# Patient Record
Sex: FEMALE | Race: BLACK OR AFRICAN AMERICAN
[De-identification: names, ages, dates, MRNs, and addresses within clinical notes are randomized per-mention and may not be internally consistent; named-entity substitution may affect disease eponyms.]

---

## 2020-03-06 ENCOUNTER — HOSPITAL ENCOUNTER (INPATIENT)
Dept: HOSPITAL 74 - YASAS | Age: 45
LOS: 4 days | Discharge: HOME | End: 2020-03-10
Attending: ALLERGY & IMMUNOLOGY | Admitting: ALLERGY & IMMUNOLOGY
Payer: COMMERCIAL

## 2020-03-06 VITALS — BODY MASS INDEX: 26.1 KG/M2

## 2020-03-06 DIAGNOSIS — K21.9: ICD-10-CM

## 2020-03-06 DIAGNOSIS — D64.9: ICD-10-CM

## 2020-03-06 DIAGNOSIS — J45.909: ICD-10-CM

## 2020-03-06 DIAGNOSIS — F10.230: Primary | ICD-10-CM

## 2020-03-06 DIAGNOSIS — Z59.0: ICD-10-CM

## 2020-03-06 DIAGNOSIS — F17.210: ICD-10-CM

## 2020-03-06 DIAGNOSIS — F33.9: ICD-10-CM

## 2020-03-06 DIAGNOSIS — F19.24: ICD-10-CM

## 2020-03-06 PROCEDURE — HZ2ZZZZ DETOXIFICATION SERVICES FOR SUBSTANCE ABUSE TREATMENT: ICD-10-PCS | Performed by: ALLERGY & IMMUNOLOGY

## 2020-03-06 RX ADMIN — Medication SCH TAB: at 14:52

## 2020-03-06 RX ADMIN — NICOTINE SCH MG: 7 PATCH TRANSDERMAL at 14:52

## 2020-03-06 RX ADMIN — HYDROXYZINE PAMOATE SCH MG: 25 CAPSULE ORAL at 18:05

## 2020-03-06 RX ADMIN — HYDROXYZINE PAMOATE SCH MG: 25 CAPSULE ORAL at 22:18

## 2020-03-06 RX ADMIN — Medication SCH MG: at 22:19

## 2020-03-06 RX ADMIN — HYDROXYZINE PAMOATE SCH MG: 25 CAPSULE ORAL at 14:52

## 2020-03-06 RX ADMIN — Medication SCH MG: at 22:18

## 2020-03-06 SDOH — ECONOMIC STABILITY - HOUSING INSECURITY: HOMELESSNESS: Z59.0

## 2020-03-06 NOTE — BHS.RME
Substance Use & Tx History





- Substance Use History


  ** Alcohol


Substance amount: 1 pint to one liter


Frequency of use: Daily


Substance route: Oral


Date of Last Use: 20





  ** Cannabis


Substance amount: one blunt


Frequency of use: Once a month


Substance route: Smoking


Date of Last Use: 20





Physical/Psych/Mental Status





- Behavior


General Behavior: Decreased activity


Eye Contact: Decreased





- Cooperativeness


Cooperativeness: Cooperative





- Thinking


Thought Processes: Tight


Thought content: Suicidal ideation ("I just want to do it, but I'm not going to 

do it")





- Physical Health Problems


Is patient presently having any pain?: No


Does patient presently have any injuries (include location): No


Does patient currently have a fever: No





CIWA


Nausea/Vomitin-Mild Nausea/No Vomiting


Muscle Tremors: 3


Anxiety: 1-Mildly Anxious


Agitation: 0-Normal Activity


Paroxysmal Sweats: No Perspiration


Orientation: 0-Oriented


Tacttile Disturbances: 0-None


Auditory Disturbances: 2-Mild Harshness/Frighten


Visual Disturbances: 3-Moderate Sensitivity


Headache: 2-Mild


CIWA-Ar Total Score: 12
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
## 2020-03-07 LAB
ALBUMIN SERPL-MCNC: 3.5 G/DL (ref 3.4–5)
ALP SERPL-CCNC: 73 U/L (ref 45–117)
ALT SERPL-CCNC: 24 U/L (ref 13–61)
ANION GAP SERPL CALC-SCNC: 8 MMOL/L (ref 8–16)
AST SERPL-CCNC: 24 U/L (ref 15–37)
BILIRUB SERPL-MCNC: 0.6 MG/DL (ref 0.2–1)
BUN SERPL-MCNC: 11.9 MG/DL (ref 7–18)
CALCIUM SERPL-MCNC: 9.1 MG/DL (ref 8.5–10.1)
CHLORIDE SERPL-SCNC: 105 MMOL/L (ref 98–107)
CO2 SERPL-SCNC: 28 MMOL/L (ref 21–32)
CREAT SERPL-MCNC: 0.9 MG/DL (ref 0.55–1.3)
DEPRECATED RDW RBC AUTO: 18.4 % (ref 11.6–15.6)
GLUCOSE SERPL-MCNC: 83 MG/DL (ref 74–106)
HCT VFR BLD CALC: 34.8 % (ref 32.4–45.2)
HGB BLD-MCNC: 11.3 GM/DL (ref 10.7–15.3)
MCH RBC QN AUTO: 27.4 PG (ref 25.7–33.7)
MCHC RBC AUTO-ENTMCNC: 32.4 G/DL (ref 32–36)
MCV RBC: 84.6 FL (ref 80–96)
PLATELET # BLD AUTO: 248 K/MM3 (ref 134–434)
PMV BLD: 9.1 FL (ref 7.5–11.1)
POTASSIUM SERPLBLD-SCNC: 4.4 MMOL/L (ref 3.5–5.1)
PROT SERPL-MCNC: 7.7 G/DL (ref 6.4–8.2)
RBC # BLD AUTO: 4.11 M/MM3 (ref 3.6–5.2)
SODIUM SERPL-SCNC: 142 MMOL/L (ref 136–145)
WBC # BLD AUTO: 4.1 K/MM3 (ref 4–10)

## 2020-03-07 RX ADMIN — Medication SCH MG: at 22:24

## 2020-03-07 RX ADMIN — HYDROXYZINE PAMOATE SCH: 25 CAPSULE ORAL at 19:23

## 2020-03-07 RX ADMIN — HYDROXYZINE PAMOATE SCH MG: 25 CAPSULE ORAL at 05:31

## 2020-03-07 RX ADMIN — HYDROXYZINE PAMOATE SCH: 25 CAPSULE ORAL at 13:39

## 2020-03-07 RX ADMIN — HYDROXYZINE PAMOATE SCH: 25 CAPSULE ORAL at 22:24

## 2020-03-07 RX ADMIN — HYDROXYZINE PAMOATE SCH MG: 25 CAPSULE ORAL at 10:25

## 2020-03-07 RX ADMIN — Medication SCH TAB: at 10:25

## 2020-03-07 RX ADMIN — NICOTINE SCH MG: 7 PATCH TRANSDERMAL at 10:26

## 2020-03-07 NOTE — PN
[facility] CIWA





- CIWA Score


Nausea/Vomitin-Mild Nausea/No Vomiting


Muscle Tremors: 2


Anxiety: 2


Agitation: 2


Paroxysmal Sweats: No Perspiration


Orientation: 0-Oriented


Tacttile Disturbances: 1-Very Mild Itch/Numbness


Auditory Disturbances: 0-None


Visual Disturbances: 0-None


Headache: 1-Very Mild


CIWA-Ar Total Score: 9





BHS Progress Note (SOAP)


Subjective: 





alert,irritable,anxious,interrupted sleep,tremor


Objective: 





20 13:46


                                   Vital Signs











Temperature  97.3 F L  20 08:47


 


Pulse Rate  72   20 08:47


 


Respiratory Rate  17   20 08:47


 


Blood Pressure  118/62   20 08:47


 


O2 Sat by Pulse Oximetry (%)      








                             Laboratory Last Values











WBC  4.1 K/mm3 (4.0-10.0)   20  05:45    


 


RBC  4.11 M/mm3 (3.60-5.2)   20  05:45    


 


Hgb  11.3 GM/dL (10.7-15.3)   20  05:45    


 


Hct  34.8 % (32.4-45.2)   20  05:45    


 


MCV  84.6 fl (80-96)   20  05:45    


 


MCH  27.4 pg (25.7-33.7)   20  05:45    


 


MCHC  32.4 g/dl (32.0-36.0)   20  05:45    


 


RDW  18.4 % (11.6-15.6)  H  20  05:45    


 


Plt Count  248 K/MM3 (134-434)   20  05:45    


 


MPV  9.1 fl (7.5-11.1)   20  05:45    


 


Sodium  142 mmol/L (136-145)   20  05:45    


 


Potassium  4.4 mmol/L (3.5-5.1)   20  05:45    


 


Chloride  105 mmol/L ()   20  05:45    


 


Carbon Dioxide  28 mmol/L (21-32)   20  05:45    


 


Anion Gap  8 MMOL/L (8-16)   20  05:45    


 


BUN  11.9 mg/dL (7-18)   20  05:45    


 


Creatinine  0.9 mg/dL (0.55-1.3)   20  05:45    


 


Est GFR (CKD-EPI)AfAm  90.76   20  05:45    


 


Est GFR (CKD-EPI)NonAf  78.31   20  05:45    


 


Random Glucose  83 mg/dL ()   20  05:45    


 


Calcium  9.1 mg/dL (8.5-10.1)   20  05:45    


 


Total Bilirubin  0.6 mg/dL (0.2-1)   20  05:45    


 


AST  24 U/L (15-37)   20  05:45    


 


ALT  24 U/L (13-61)   20  05:45    


 


Alkaline Phosphatase  73 U/L ()   20  05:45    


 


Total Protein  7.7 g/dl (6.4-8.2)   20  05:45    


 


Albumin  3.5 g/dl (3.4-5.0)   20  05:45    


 


RPR Titer  Nonreactive  (NONREACTIVE)   20  05:45    











Assessment: 





20 13:47


withdrawal symptom


Plan: 





continue detox librium regimen
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
## 2020-03-08 RX ADMIN — Medication SCH TAB: at 10:41

## 2020-03-08 RX ADMIN — HYDROXYZINE PAMOATE SCH MG: 25 CAPSULE ORAL at 05:39

## 2020-03-08 RX ADMIN — LOPERAMIDE HYDROCHLORIDE PRN MG: 2 CAPSULE ORAL at 16:17

## 2020-03-08 RX ADMIN — NICOTINE SCH MG: 7 PATCH TRANSDERMAL at 10:41

## 2020-03-08 RX ADMIN — HYDROXYZINE PAMOATE SCH MG: 25 CAPSULE ORAL at 10:41

## 2020-03-08 RX ADMIN — Medication SCH MG: at 22:31

## 2020-03-08 NOTE — PN
[facility] CIWA





- CIWA Score


Nausea/Vomitin-No Nausea/No Vomiting


Muscle Tremors: 2


Anxiety: 1-Mildly Anxious


Agitation: 2


Paroxysmal Sweats: 2


Orientation: 0-Oriented


Tacttile Disturbances: 0-None


Auditory Disturbances: 0-None


Visual Disturbances: 0-None


Headache: 0-None Present


CIWA-Ar Total Score: 7





BHS Progress Note (SOAP)


Subjective: 





sweats


diarrhea


Objective: 





20 12:49


                                   Vital Signs











Temperature  98.1 F   20 08:50


 


Pulse Rate  78   20 08:50


 


Respiratory Rate  18   20 08:50


 


Blood Pressure  129/95   20 08:50


 


O2 Sat by Pulse Oximetry (%)      








                                Laboratory Tests











  20





  05:45 05:45 05:45


 


WBC  4.1  


 


RBC  4.11  


 


Hgb  11.3  


 


Hct  34.8  


 


MCV  84.6  


 


MCH  27.4  


 


MCHC  32.4  


 


RDW  18.4 H  


 


Plt Count  248  


 


MPV  9.1  


 


Sodium   142 


 


Potassium   4.4 


 


Chloride   105 


 


Carbon Dioxide   28 


 


Anion Gap   8 


 


BUN   11.9 


 


Creatinine   0.9 


 


Est GFR (CKD-EPI)AfAm   90.76 


 


Est GFR (CKD-EPI)NonAf   78.31 


 


Random Glucose   83 


 


Calcium   9.1 


 


Total Bilirubin   0.6 


 


AST   24 


 


ALT   24 


 


Alkaline Phosphatase   73 


 


Total Protein   7.7 


 


Albumin   3.5 


 


RPR Titer    Nonreactive








labs noted


aaox3


ambulating


no acute distress


Assessment: 





20 12:50


withdrawals


Plan: 





continue detox


increase fluids


imodium prn

## 2020-03-09 RX ADMIN — LOPERAMIDE HYDROCHLORIDE PRN MG: 2 CAPSULE ORAL at 10:45

## 2020-03-09 RX ADMIN — NICOTINE SCH MG: 7 PATCH TRANSDERMAL at 10:44

## 2020-03-09 RX ADMIN — Medication SCH MG: at 22:28

## 2020-03-09 RX ADMIN — Medication SCH TAB: at 10:44

## 2020-03-09 NOTE — PN
[facility] CIWA





- CIWA Score


Nausea/Vomitin-No Nausea/No Vomiting


Muscle Tremors: 2


Anxiety: 1-Mildly Anxious


Agitation: 2


Paroxysmal Sweats: 1-Minimal Palms Moist


Orientation: 0-Oriented


Tacttile Disturbances: 0-None


Auditory Disturbances: 0-None


Visual Disturbances: 0-None


Headache: 0-None Present


CIWA-Ar Total Score: 6





BHS Progress Note (SOAP)


Subjective: 





sweats


diarrhea


nausea


interrupted sleep/insomnia





Objective: 





20 12:04


                                   Vital Signs











Temperature  99.1 F   20 08:42


 


Pulse Rate  80   20 08:42


 


Respiratory Rate  18   20 08:42


 


Blood Pressure  115/77   20 08:42


 


O2 Sat by Pulse Oximetry (%)      








                                Laboratory Tests











  20





  05:45 05:45 05:45


 


WBC  4.1  


 


RBC  4.11  


 


Hgb  11.3  


 


Hct  34.8  


 


MCV  84.6  


 


MCH  27.4  


 


MCHC  32.4  


 


RDW  18.4 H  


 


Plt Count  248  


 


MPV  9.1  


 


Sodium   142 


 


Potassium   4.4 


 


Chloride   105 


 


Carbon Dioxide   28 


 


Anion Gap   8 


 


BUN   11.9 


 


Creatinine   0.9 


 


Est GFR (CKD-EPI)AfAm   90.76 


 


Est GFR (CKD-EPI)NonAf   78.31 


 


Random Glucose   83 


 


Calcium   9.1 


 


Total Bilirubin   0.6 


 


AST   24 


 


ALT   24 


 


Alkaline Phosphatase   73 


 


Total Protein   7.7 


 


Albumin   3.5 


 


RPR Titer    Nonreactive








aaox3


lying in bed


no acute distress








Assessment: 





20 12:05


withdrawal sx








Plan: 





continue detox


increase fluids


pepto prn


melatonin 10mg

## 2020-03-10 VITALS — DIASTOLIC BLOOD PRESSURE: 69 MMHG | SYSTOLIC BLOOD PRESSURE: 125 MMHG | HEART RATE: 75 BPM | TEMPERATURE: 97.4 F

## 2020-03-10 NOTE — DS
[facility]HS Detox Discharge Summary


Admission Date: 


03/06/20





Discharge Date: 03/10/20





- History


Present History: Alcohol Dependence





- Physical Exam Results


Vital Signs: 


                                   Vital Signs











Temperature  98.2 F   03/10/20 06:52


 


Pulse Rate  82   03/10/20 06:52


 


Respiratory Rate  18   03/10/20 06:52


 


Blood Pressure  115/81   03/10/20 06:52


 


O2 Sat by Pulse Oximetry (%)      











Pertinent Admission Physical Exam Findings: 





                                   Vital Signs











Temperature  98.2 F   03/10/20 06:52


 


Pulse Rate  82   03/10/20 06:52


 


Respiratory Rate  18   03/10/20 06:52


 


Blood Pressure  115/81   03/10/20 06:52


 


O2 Sat by Pulse Oximetry (%)      








                                Laboratory Tests











  03/07/20 03/07/20 03/07/20





  05:45 05:45 05:45


 


WBC  4.1  


 


RBC  4.11  


 


Hgb  11.3  


 


Hct  34.8  


 


MCV  84.6  


 


MCH  27.4  


 


MCHC  32.4  


 


RDW  18.4 H  


 


Plt Count  248  


 


MPV  9.1  


 


Sodium   142 


 


Potassium   4.4 


 


Chloride   105 


 


Carbon Dioxide   28 


 


Anion Gap   8 


 


BUN   11.9 


 


Creatinine   0.9 


 


Est GFR (CKD-EPI)AfAm   90.76 


 


Est GFR (CKD-EPI)NonAf   78.31 


 


Random Glucose   83 


 


Calcium   9.1 


 


Total Bilirubin   0.6 


 


AST   24 


 


ALT   24 


 


Alkaline Phosphatase   73 


 


Total Protein   7.7 


 


Albumin   3.5 


 


RPR Titer    Nonreactive








aaox3


ambulating


no acute distress


+BS all 4 quadrants





- Treatment


Hospital Course: Detox Protocol Followed, Detoxed Safely, Responded well, 

Discharged Condition Good, Rehab Referral Accepted





- Medication


Discharge Medications: 


Ambulatory Orders





NK [No Known Home Medication]  03/06/20 











- Diagnosis


(1) Alcohol dependence with intoxication


Current Visit: Yes   Status: Acute   





(2) Anemia


Current Visit: Yes   Status: Acute   





(3) Asthma


Current Visit: Yes   Status: Acute   





(4) GERD (gastroesophageal reflux disease)


Current Visit: Yes   Status: Chronic   


Qualifiers: 


   Esophagitis presence: without esophagitis   Qualified Code(s): K21.9 - 

Gastro-esophageal reflux disease without esophagitis   





(5) Major depression, recurrent


Current Visit: Yes   Status: Acute   





(6) Alcohol use disorder


Current Visit: Yes   Status: Chronic   





(7) History of depression


Current Visit: Yes   Status: Chronic   





(8) Nicotine dependence


Current Visit: Yes   Status: Chronic   


Qualifiers: 


   Nicotine product type: cigarettes   Substance use status: uncomplicated   

Qualified Code(s): F17.210 - Nicotine dependence, cigarettes, uncomplicated   





(9) Substance induced mood disorder


Current Visit: Yes   Status: Chronic   





- AMA


Did Patient Leave Against Medical Advice: No

## 2020-08-07 ENCOUNTER — HOSPITAL ENCOUNTER (INPATIENT)
Dept: HOSPITAL 74 - YASAS | Age: 45
LOS: 3 days | Discharge: HOME | DRG: 773 | End: 2020-08-10
Attending: ALLERGY & IMMUNOLOGY | Admitting: ALLERGY & IMMUNOLOGY
Payer: COMMERCIAL

## 2020-08-07 VITALS — BODY MASS INDEX: 26.5 KG/M2

## 2020-08-07 DIAGNOSIS — J45.20: ICD-10-CM

## 2020-08-07 DIAGNOSIS — F10.230: Primary | ICD-10-CM
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
## 2020-08-07 DIAGNOSIS — F12.20: ICD-10-CM

## 2020-08-07 DIAGNOSIS — S61.218D: ICD-10-CM

## 2020-08-07 DIAGNOSIS — F19.24: ICD-10-CM

## 2020-08-07 DIAGNOSIS — Z59.0: ICD-10-CM

## 2020-08-07 DIAGNOSIS — K21.9: ICD-10-CM

## 2020-08-07 DIAGNOSIS — F17.213: ICD-10-CM

## 2020-08-07 DIAGNOSIS — R00.0: ICD-10-CM

## 2020-08-07 DIAGNOSIS — G47.00: ICD-10-CM

## 2020-08-07 DIAGNOSIS — Z91.19: ICD-10-CM

## 2020-08-07 DIAGNOSIS — Z91.5: ICD-10-CM

## 2020-08-07 DIAGNOSIS — S41.112D: ICD-10-CM

## 2020-08-07 DIAGNOSIS — F11.20: ICD-10-CM

## 2020-08-07 PROCEDURE — HZ2ZZZZ DETOXIFICATION SERVICES FOR SUBSTANCE ABUSE TREATMENT: ICD-10-PCS | Performed by: ALLERGY & IMMUNOLOGY

## 2020-08-07 PROCEDURE — U0003 INFECTIOUS AGENT DETECTION BY NUCLEIC ACID (DNA OR RNA); SEVERE ACUTE RESPIRATORY SYNDROME CORONAVIRUS 2 (SARS-COV-2) (CORONAVIRUS DISEASE [COVID-19]), AMPLIFIED PROBE TECHNIQUE, MAKING USE OF HIGH THROUGHPUT TECHNOLOGIES AS DESCRIBED BY CMS-2020-01-R: HCPCS

## 2020-08-07 RX ADMIN — HYDROXYZINE PAMOATE SCH MG: 25 CAPSULE ORAL at 22:14

## 2020-08-07 RX ADMIN — HYDROXYZINE PAMOATE SCH MG: 25 CAPSULE ORAL at 13:44

## 2020-08-07 RX ADMIN — Medication SCH TAB: at 13:44

## 2020-08-07 RX ADMIN — BUPRENORPHINE HYDROCHLORIDE, NALOXONE HYDROCHLORIDE SCH EACH: 8; 2 FILM, SOLUBLE BUCCAL; SUBLINGUAL at 22:15

## 2020-08-07 RX ADMIN — Medication SCH MG: at 22:14

## 2020-08-07 RX ADMIN — NICOTINE SCH MG: 7 PATCH TRANSDERMAL at 13:43

## 2020-08-07 RX ADMIN — HYDROXYZINE PAMOATE SCH MG: 25 CAPSULE ORAL at 17:39

## 2020-08-07 SDOH — ECONOMIC STABILITY - HOUSING INSECURITY: HOMELESSNESS: Z59.0
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
## 2020-08-07 NOTE — BHS.RME
Substance Use & Tx History





- Substance Use History


  ** Alcohol


Substance amount: 1 pint vodka


Frequency of use: Daily


Substance route: Oral


Date of Last Use: 08/06/20





  ** Marijuana/Hashish


Substance amount: $10


Frequency of use: Once a month


Substance route: Smoking


Date of Last Use: 07/31/20





  ** Nicotine


Substance amount: 1/2 PACK


Frequency of use: Daily


Substance route: Smoking


Date of Last Use: 08/07/20





Physical/Psych/Mental Status





- Behavior


General Behavior: Increased activity (restlessness, agitation)


Eye Contact: Normal





- Cooperativeness


Cooperativeness: Cooperative





- Thinking


Thought Processes: Tight, Logical, Goal Directed





- Physical Health Problems


Is patient presently having any pain?: No


Does patient presently have any injuries (include location): No


Does patient currently have a fever: No


Is patient pregnant: No





CIWA


Nausea/Vomiting: 3


Muscle Tremors: 6


Anxiety: 3


Agitation: 3


Paroxysmal Sweats: 1-Minimal Palms Moist


Orientation: 0-Oriented


Tacttile Disturbances: 0-None


Auditory Disturbances: 0-None


Visual Disturbances: 0-None


Headache: 2-Mild


CIWA-Ar Total Score: 18

## 2020-08-07 NOTE — PN
[facility] Progress Note


Note: 





Pt states she is on Suboxone





Patient Name: Slime BryantBirth Date: 1975


Address: [address] [place] [id number]: Female


Rx Written   Rx Dispensed   Drug   Quantity   Days Supply   Prescriber Name


07/20/2020 07/20/2020   buprenorphine-naloxone 8-2 mg sl film   90   30   

Deion Balbuena NP


06/18/2020 06/19/2020   buprenorphine-naloxone 8-2 mg sl film   90   30   

Deion Balbuena NP


10/01/2019   10/02/2019   buprenorphine-naloxone 8-2 mg sl film   30   10   

Rik Durand (PA)


09/05/2019 09/05/2019   buprenorphine-naloxone 8-2 mg sl film   60   20   Rik Brown (PA)





Will order Suboxone, pt states she takes it tid.

## 2020-08-08 LAB
ALBUMIN SERPL-MCNC: 3.2 G/DL (ref 3.4–5)
ALP SERPL-CCNC: 59 U/L (ref 45–117)
ALT SERPL-CCNC: 29 U/L (ref 13–61)
ANION GAP SERPL CALC-SCNC: 9 MMOL/L (ref 8–16)
AST SERPL-CCNC: 29 U/L (ref 15–37)
BILIRUB SERPL-MCNC: 0.9 MG/DL (ref 0.2–1)
BUN SERPL-MCNC: 9.4 MG/DL (ref 7–18)
CALCIUM SERPL-MCNC: 9.1 MG/DL (ref 8.5–10.1)
CHLORIDE SERPL-SCNC: 105 MMOL/L (ref 98–107)
CO2 SERPL-SCNC: 25 MMOL/L (ref 21–32)
CREAT SERPL-MCNC: 0.9 MG/DL (ref 0.55–1.3)
DEPRECATED RDW RBC AUTO: 17.8 % (ref 11.6–15.6)
GLUCOSE SERPL-MCNC: 103 MG/DL (ref 74–106)
HCT VFR BLD CALC: 34.7 % (ref 32.4–45.2)
HGB BLD-MCNC: 11.1 GM/DL (ref 10.7–15.3)
MCH RBC QN AUTO: 27.3 PG (ref 25.7–33.7)
MCHC RBC AUTO-ENTMCNC: 32 G/DL (ref 32–36)
MCV RBC: 85.2 FL (ref 80–96)
PLATELET # BLD AUTO: 159 K/MM3 (ref 134–434)
PMV BLD: 9.1 FL (ref 7.5–11.1)
POTASSIUM SERPLBLD-SCNC: 3.7 MMOL/L (ref 3.5–5.1)
PROT SERPL-MCNC: 6.9 G/DL (ref 6.4–8.2)
RBC # BLD AUTO: 4.07 M/MM3 (ref 3.6–5.2)
SODIUM SERPL-SCNC: 140 MMOL/L (ref 136–145)
WBC # BLD AUTO: 3.8 K/MM3 (ref 4–10)

## 2020-08-08 RX ADMIN — NICOTINE SCH MG: 7 PATCH TRANSDERMAL at 10:30

## 2020-08-08 RX ADMIN — Medication SCH MG: at 21:54

## 2020-08-08 RX ADMIN — ALUMINUM HYDROXIDE, MAGNESIUM HYDROXIDE, AND SIMETHICONE PRN ML: 200; 200; 20 SUSPENSION ORAL at 12:39

## 2020-08-08 RX ADMIN — NICOTINE POLACRILEX PRN MG: 2 GUM, CHEWING ORAL at 05:21

## 2020-08-08 RX ADMIN — HYDROXYZINE PAMOATE SCH MG: 25 CAPSULE ORAL at 05:20

## 2020-08-08 RX ADMIN — Medication SCH TAB: at 10:29

## 2020-08-08 RX ADMIN — HYDROXYZINE PAMOATE SCH MG: 25 CAPSULE ORAL at 17:58

## 2020-08-08 RX ADMIN — BUPRENORPHINE HYDROCHLORIDE, NALOXONE HYDROCHLORIDE SCH EACH: 8; 2 FILM, SOLUBLE BUCCAL; SUBLINGUAL at 05:20

## 2020-08-08 RX ADMIN — HYDROXYZINE PAMOATE SCH: 25 CAPSULE ORAL at 10:30

## 2020-08-08 RX ADMIN — BUPRENORPHINE HYDROCHLORIDE, NALOXONE HYDROCHLORIDE SCH EACH: 8; 2 FILM, SOLUBLE BUCCAL; SUBLINGUAL at 14:21

## 2020-08-08 RX ADMIN — HYDROXYZINE PAMOATE SCH MG: 25 CAPSULE ORAL at 21:54

## 2020-08-08 RX ADMIN — BUPRENORPHINE HYDROCHLORIDE, NALOXONE HYDROCHLORIDE SCH EACH: 8; 2 FILM, SOLUBLE BUCCAL; SUBLINGUAL at 21:54

## 2020-08-08 RX ADMIN — HYDROXYZINE PAMOATE SCH MG: 25 CAPSULE ORAL at 14:21

## 2020-08-08 NOTE — PN
[facility] CIWA





- CIWA Score


Nausea/Vomitin-No Nausea/No Vomiting


Muscle Tremors: 2


Anxiety: 3


Agitation: 1-Slight > Activity


Paroxysmal Sweats: 3


Orientation: 0-Oriented


Tacttile Disturbances: 0-None


Auditory Disturbances: 0-None


Visual Disturbances: 0-None


Headache: 2-Mild


CIWA-Ar Total Score: 11





BHS Progress Note (SOAP)


Subjective: 





c/o irritability, anxiety, sweats, shakes, and headache.


Objective: 





20 11:39


                                   Vital Signs











  20





  06:34 08:48


 


Temperature 97.4 F L 97.2 F L


 


Pulse Rate 66 84


 


Respiratory 18 20





Rate  


 


Blood Pressure 99/61 121/80


 


O2 Sat by Pulse 97 





Oximetry (%)  








                             Laboratory Last Values











WBC  3.8 K/mm3 (4.0-10.0)  L  20  06:50    


 


RBC  4.07 M/mm3 (3.60-5.2)   20  06:50    


 


Hgb  11.1 GM/dL (10.7-15.3)   20  06:50    


 


Hct  34.7 % (32.4-45.2)   20  06:50    


 


MCV  85.2 fl (80-96)   20  06:50    


 


MCH  27.3 pg (25.7-33.7)   20  06:50    


 


MCHC  32.0 g/dl (32.0-36.0)   20  06:50    


 


RDW  17.8 % (11.6-15.6)  H  20  06:50    


 


Plt Count  159 K/MM3 (134-434)   20  06:50    


 


MPV  9.1 fl (7.5-11.1)   20  06:50    


 


Sodium  140 mmol/L (136-145)   20  06:50    


 


Potassium  3.7 mmol/L (3.5-5.1)   20  06:50    


 


Chloride  105 mmol/L ()   20  06:50    


 


Carbon Dioxide  25 mmol/L (21-32)   20  06:50    


 


Anion Gap  9 MMOL/L (8-16)   20  06:50    


 


BUN  9.4 mg/dL (7-18)   20  06:50    


 


Creatinine  0.9 mg/dL (0.55-1.3)   20  06:50    


 


Est GFR (CKD-EPI)AfAm  90.76   20  06:50    


 


Est GFR (CKD-EPI)NonAf  78.31   20  06:50    


 


Random Glucose  103 mg/dL ()   20  06:50    


 


Calcium  9.1 mg/dL (8.5-10.1)   20  06:50    


 


Total Bilirubin  0.9 mg/dL (0.2-1)   20  06:50    


 


AST  29 U/L (15-37)   20  06:50    


 


ALT  29 U/L (13-61)   20  06:50    


 


Alkaline Phosphatase  59 U/L ()   20  06:50    


 


Total Protein  6.9 g/dl (6.4-8.2)   20  06:50    


 


Albumin  3.2 g/dl (3.4-5.0)  L  20  06:50    


 


Syphilis Serology  Non-reactive  (NONREACTIVE)   20  06:50    








Labs noted.


Assessment: 





20 11:39


AOX3 and in no acute respiratory distress.


Full ROM, ambulating in the unit.


Withdrawal symptoms.


Plan: 





continue detox.

## 2020-08-09 RX ADMIN — BUPRENORPHINE HYDROCHLORIDE, NALOXONE HYDROCHLORIDE SCH EACH: 8; 2 FILM, SOLUBLE BUCCAL; SUBLINGUAL at 22:18

## 2020-08-09 RX ADMIN — BACITRACIN ZINC SCH GM: 500 OINTMENT TOPICAL at 13:07

## 2020-08-09 RX ADMIN — HYDROXYZINE PAMOATE SCH MG: 25 CAPSULE ORAL at 17:38

## 2020-08-09 RX ADMIN — BUPRENORPHINE HYDROCHLORIDE, NALOXONE HYDROCHLORIDE SCH EACH: 8; 2 FILM, SOLUBLE BUCCAL; SUBLINGUAL at 05:22

## 2020-08-09 RX ADMIN — HYDROXYZINE PAMOATE SCH MG: 25 CAPSULE ORAL at 14:07

## 2020-08-09 RX ADMIN — ALUMINUM HYDROXIDE, MAGNESIUM HYDROXIDE, AND SIMETHICONE PRN ML: 200; 200; 20 SUSPENSION ORAL at 10:19

## 2020-08-09 RX ADMIN — ALUMINUM HYDROXIDE, MAGNESIUM HYDROXIDE, AND SIMETHICONE PRN ML: 200; 200; 20 SUSPENSION ORAL at 20:10

## 2020-08-09 RX ADMIN — HYDROXYZINE PAMOATE SCH MG: 25 CAPSULE ORAL at 10:19

## 2020-08-09 RX ADMIN — HYDROXYZINE PAMOATE SCH MG: 25 CAPSULE ORAL at 05:22

## 2020-08-09 RX ADMIN — BUPRENORPHINE HYDROCHLORIDE, NALOXONE HYDROCHLORIDE SCH EACH: 8; 2 FILM, SOLUBLE BUCCAL; SUBLINGUAL at 14:07

## 2020-08-09 RX ADMIN — Medication SCH MG: at 22:18

## 2020-08-09 RX ADMIN — NICOTINE POLACRILEX PRN MG: 2 GUM, CHEWING ORAL at 10:21

## 2020-08-09 RX ADMIN — HYDROXYZINE PAMOATE SCH MG: 25 CAPSULE ORAL at 22:18

## 2020-08-09 RX ADMIN — BACITRACIN ZINC SCH GM: 500 OINTMENT TOPICAL at 22:19

## 2020-08-09 RX ADMIN — Medication SCH TAB: at 10:19

## 2020-08-09 RX ADMIN — NICOTINE SCH MG: 7 PATCH TRANSDERMAL at 10:20

## 2020-08-09 NOTE — PN
S CIWA





- CIWA Score


Nausea/Vomitin-Mild Nausea/No Vomiting


Muscle Tremors: 4-Moderate,w/Arms Extend


Anxiety: 3


Agitation: 1-Slight > Activity


Paroxysmal Sweats: 1-Minimal Palms Moist


Orientation: 0-Oriented


Tacttile Disturbances: 0-None


Auditory Disturbances: 0-None


Visual Disturbances: 0-None


Headache: 0-None Present


CIWA-Ar Total Score: 10





BHS Progress Note (SOAP)


Subjective: 





44 years old female admitted on 20 for alcohol withdrawal sx management 

treating with librium detox regiment


feeling nausea denies vomiting


zofran 4 mg sl x 1





left inferior deltoid "glass" cut sutures intact mild epidermal edematous no 

bleed no exudates


left mid and 4th distal fingers "glass" cuts noted


sutures intact


encourage ms rufus to keep area clear and dry free of irritation 








Objective: 





20 12:48


                               Vital Signs - 24 hr











  20





  16:50 20:44 05:35


 


Temperature 96.9 F L 97.3 F L 97.3 F L


 


Pulse Rate 75 76 91 H


 


Respiratory 16 16 18





Rate   


 


Blood Pressure 134/94 142/85 100/60


 


O2 Sat by Pulse  97 97





Oximetry (%)   














  20





  08:36


 


Temperature 97.3 F L


 


Pulse Rate 106 H


 


Respiratory 20





Rate 


 


Blood Pressure 117/89


 


O2 Sat by Pulse 





Oximetry (%) 








                                Laboratory Tests











  20





  12:00 06:50 06:50


 


WBC   3.8 L 


 


RBC   4.07 


 


Hgb   11.1 


 


Hct   34.7 


 


MCV   85.2 


 


MCH   27.3 


 


MCHC   32.0 


 


RDW   17.8 H 


 


Plt Count   159 


 


MPV   9.1 


 


Sodium    140


 


Potassium    3.7


 


Chloride    105


 


Carbon Dioxide    25


 


Anion Gap    9


 


BUN    9.4


 


Creatinine    0.9


 


Est GFR (CKD-EPI)AfAm    90.76


 


Est GFR (CKD-EPI)NonAf    78.31


 


Random Glucose    103


 


Calcium    9.1


 


Total Bilirubin    0.9


 


AST    29


 


ALT    29


 


Alkaline Phosphatase    59


 


Total Protein    6.9


 


Albumin    3.2 L


 


Syphilis Serology   


 


COVID-19 ([facility])  Not detected  














  20





  06:50


 


WBC 


 


RBC 


 


Hgb 


 


Hct 


 


MCV 


 


MCH 


 


MCHC 


 


RDW 


 


Plt Count 


 


MPV 


 


Sodium 


 


Potassium 


 


Chloride 


 


Carbon Dioxide 


 


Anion Gap 


 


BUN 


 


Creatinine 


 


Est GFR (CKD-EPI)AfAm 


 


Est GFR (CKD-EPI)NonAf 


 


Random Glucose 


 


Calcium 


 


Total Bilirubin 


 


AST 


 


ALT 


 


Alkaline Phosphatase 


 


Total Protein 


 


Albumin 


 


Syphilis Serology  Non-reactive


 


COVID-19 ([facility]) 











Assessment: 





20 12:48


alcohol withdrawal 


Plan: 





librium regiment

## 2020-08-10 VITALS — HEART RATE: 105 BPM | TEMPERATURE: 96.9 F | SYSTOLIC BLOOD PRESSURE: 107 MMHG | DIASTOLIC BLOOD PRESSURE: 78 MMHG

## 2020-08-10 RX ADMIN — HYDROXYZINE PAMOATE SCH MG: 25 CAPSULE ORAL at 05:01

## 2020-08-10 RX ADMIN — BUPRENORPHINE HYDROCHLORIDE, NALOXONE HYDROCHLORIDE SCH EACH: 8; 2 FILM, SOLUBLE BUCCAL; SUBLINGUAL at 05:02

## 2020-08-10 NOTE — DS
[facility] Detox Discharge Summary


Admission Date: 


20





Discharge Date: 08/10/20





- History


Present History: Alcohol Dependence


Additional Comments: 





44 years old female admitted on 20 for alcohol withdrawal sx management 

treated with librium detox regiment


ms hooper states that she feels better today and prefers to  belonging 

from shelter and follow up with suboxone program for alcohol and opiate recovery

through behavioral and psychosocial therapies as well as "glass cut" care follow

up with [facility] for sutures removal


left arm deltoid sutures and left 4th distal finger sutures intact no signs of 

infection noted denies pain no bleed no exudate none tender





seen by psychiatrist no medical intervention necessary 


alert oriented x 3 speech clearly coherently 





General Appearance: Yes: No Apparent Distress, Moderate Distress, Thin, 

Tremorous, Irritable, Sweating, Anxious


HEENTM: Yes: EOMI, Hearing grossly Normal, Normal ENT Inspection, Normocephalic,

Normal Voice, ISHMAEL, Pharynx Normal, Tm's normal


Respiratory: Yes: Chest Non-Tender, Lungs Clear, Normal Breath Sounds, No 

Respiratory Distress, No Accessory Muscle Use


Neck: Yes: No masses,lesions,Nodules, Supple, Trachea in good position


Breast: Yes: Breast Exam Deferred


Cardiology: Yes: Regular Rhythm, S1, S2, Tachycardia


Abdominal: Yes: Normal Bowel Sounds, Non Tender, Soft, Protuberent


Genitourinary: Yes: Within Normal Limits


Back: Yes: Normal Inspection


Musculoskeletal: Yes: full range of Motion, Gait Steady, Pelvis Stable


Extremities: Yes: Normal Capillary Refill, Normal Inspection, Normal Range of 

Motion, Non-Tender, Other (laceration left upper arm with sutures in arm right 

finger laceration that is wrapped.)


Neurological: Yes: CNs II-XII NML intact, Fully Oriented, Alert, Motor Strength 

5/5, Normal Mood/Affect, Normal Response


Integumentary: Yes: Normal Color, Dry, Warm


Lymphatic: Yes: Within Normal Limits


Pertinent Past History: 





time for discharge 45 minutes





treatment team met with ms hooper to discuss benefits of librium regiment 

completion


ms hooper insists to guard her belonging at the shelter and prefers to follow 

up with suboxone program for alcohol recovery 





- Physical Exam Results


Vital Signs: 


                                   Vital Signs











Temperature  97.3 F L  08/10/20 06:49


 


Pulse Rate  89   08/10/20 06:49


 


Respiratory Rate  18   08/10/20 06:49


 


Blood Pressure  101/68   08/10/20 06:49


 


O2 Sat by Pulse Oximetry (%)  98   08/10/20 06:49











Pertinent Admission Physical Exam Findings: 





alcohol withdrawal 


                               Vital Signs - 24 hr











  20





  12:36 16:37 20:23


 


Temperature 97.1 F L 97.3 F L 97.3 F L


 


Pulse Rate 82 83 72


 


Respiratory 18 18 18





Rate   


 


Blood Pressure 136/90 119/87 152/95


 


O2 Sat by Pulse 100  100





Oximetry (%)   














  08/10/20 08/10/20





  06:49 08:43


 


Temperature 97.3 F L 96.9 F L


 


Pulse Rate 89 105 H


 


Respiratory 18 18





Rate  


 


Blood Pressure 101/68 107/78


 


O2 Sat by Pulse 98 





Oximetry (%)  








                                Laboratory Tests











  20





  12:00 06:50 06:50


 


WBC   3.8 L 


 


RBC   4.07 


 


Hgb   11.1 


 


Hct   34.7 


 


MCV   85.2 


 


MCH   27.3 


 


MCHC   32.0 


 


RDW   17.8 H 


 


Plt Count   159 


 


MPV   9.1 


 


Sodium    140


 


Potassium    3.7


 


Chloride    105


 


Carbon Dioxide    25


 


Anion Gap    9


 


BUN    9.4


 


Creatinine    0.9


 


Est GFR (CKD-EPI)AfAm    90.76


 


Est GFR (CKD-EPI)NonAf    78.31


 


Random Glucose    103


 


Calcium    9.1


 


Total Bilirubin    0.9


 


AST    29


 


ALT    29


 


Alkaline Phosphatase    59


 


Total Protein    6.9


 


Albumin    3.2 L


 


Syphilis Serology   


 


COVID-19 ([facility])  Not detected  














  20





  06:50


 


WBC 


 


RBC 


 


Hgb 


 


Hct 


 


MCV 


 


MCH 


 


MCHC 


 


RDW 


 


Plt Count 


 


MPV 


 


Sodium 


 


Potassium 


 


Chloride 


 


Carbon Dioxide 


 


Anion Gap 


 


BUN 


 


Creatinine 


 


Est GFR (CKD-EPI)AfAm 


 


Est GFR (CKD-EPI)NonAf 


 


Random Glucose 


 


Calcium 


 


Total Bilirubin 


 


AST 


 


ALT 


 


Alkaline Phosphatase 


 


Total Protein 


 


Albumin 


 


Syphilis Serology  Non-reactive


 


COVID-19 ([facility]) 








lab noted





- Treatment


Hospital Course: Detox Protocol Followed, Detoxed Safely, Responded well, 

Discharged Condition Good, Rehab Referral Accepted


Patient has Accepted a Rehab Referral to: suboxone maintenance program 





- Medication


Discharge Medications: 


Ambulatory Orders





NK [No Known Home Medication]  20 











- Diagnosis


(1) Alcohol dependence, uncomplicated


Status: Acute   





(2) Encounter for monitoring Suboxone maintenance therapy


Status: Chronic   





(3) Substance induced mood disorder


Status: Suspected   





(4) Asthma


Status: Chronic   


Qualifiers: 


   Asthma severity: mild   Asthma persistence: intermittent   Asthma 

complication type: with status asthmaticus   Qualified Code(s): J45.22 - Mild 

intermittent asthma with status asthmaticus   





(5) GERD (gastroesophageal reflux disease)


Status: Chronic   


Qualifiers: 


   Esophagitis presence: without esophagitis   Qualified Code(s): K21.9 - 

Gastro-esophageal reflux disease without esophagitis   





(6) Nicotine dependence


Status: Acute   


Qualifiers: 


   Nicotine product type: cigarettes   Substance use status: in withdrawal   

Qualified Code(s): F17.213 - Nicotine dependence, cigarettes, with withdrawal   





(7) Substance induced mood disorder


Status: Suspected   





(8) Laceration


Status: Acute   





- AMA


Did Patient Leave Against Medical Advice: No





CIWA Score





- CIWA Score


Nausea/Vomitin-No Nausea/No Vomiting


Muscle Tremors: 2


Anxiety: 2


Agitation: 1-Slight > Activity


Paroxysmal Sweats: No Perspiration


Orientation: 0-Oriented


Tacttile Disturbances: 0-None


Auditory Disturbances: 0-None


Visual Disturbances: 0-None


Headache: 0-None Present


CIWA-Ar Total Score: 5

## 2020-09-03 ENCOUNTER — HOSPITAL ENCOUNTER (INPATIENT)
Dept: HOSPITAL 74 - YASAS | Age: 45
LOS: 5 days | Discharge: OTHER INSTITUTION | DRG: 773 | End: 2020-09-08
Attending: ALLERGY & IMMUNOLOGY | Admitting: ALLERGY & IMMUNOLOGY
Payer: COMMERCIAL

## 2020-09-03 VITALS — BODY MASS INDEX: 26.6 KG/M2

## 2020-09-03 DIAGNOSIS — G47.00: ICD-10-CM

## 2020-09-03 DIAGNOSIS — F17.210: ICD-10-CM

## 2020-09-03 DIAGNOSIS — K21.9: ICD-10-CM

## 2020-09-03 DIAGNOSIS — Z79.899: ICD-10-CM

## 2020-09-03 DIAGNOSIS — F10.230: Primary | ICD-10-CM

## 2020-09-03 DIAGNOSIS — L85.3: ICD-10-CM

## 2020-09-03 DIAGNOSIS — F11.20: ICD-10-CM

## 2020-09-03 DIAGNOSIS — F12.10: ICD-10-CM

## 2020-09-03 DIAGNOSIS — Z51.81: ICD-10-CM

## 2020-09-03 DIAGNOSIS — D50.9: ICD-10-CM

## 2020-09-03 DIAGNOSIS — F19.24: ICD-10-CM

## 2020-09-03 DIAGNOSIS — Z91.19: ICD-10-CM

## 2020-09-03 DIAGNOSIS — J45.909: ICD-10-CM

## 2020-09-03 PROCEDURE — U0003 INFECTIOUS AGENT DETECTION BY NUCLEIC ACID (DNA OR RNA); SEVERE ACUTE RESPIRATORY SYNDROME CORONAVIRUS 2 (SARS-COV-2) (CORONAVIRUS DISEASE [COVID-19]), AMPLIFIED PROBE TECHNIQUE, MAKING USE OF HIGH THROUGHPUT TECHNOLOGIES AS DESCRIBED BY CMS-2020-01-R: HCPCS

## 2020-09-03 PROCEDURE — HZ2ZZZZ DETOXIFICATION SERVICES FOR SUBSTANCE ABUSE TREATMENT: ICD-10-PCS | Performed by: ALLERGY & IMMUNOLOGY

## 2020-09-03 RX ADMIN — Medication SCH: at 23:03

## 2020-09-03 RX ADMIN — BACITRACIN ZINC SCH GM: 500 OINTMENT TOPICAL at 23:03

## 2020-09-03 RX ADMIN — Medication SCH MG: at 23:02

## 2020-09-03 NOTE — HP
CIWA Score


Nausea/Vomitin-Mild Nausea/No Vomiting


Muscle Tremors: 3


Anxiety: 4-Mod. Anxious/Guarded


Agitation: 1-Slight > Activity


Paroxysmal Sweats: 2


Orientation: 0-Oriented


Tacttile Disturbances: 2-Mild Itch/Numbness/Burn


Auditory Disturbances: 0-None


Visual Disturbances: 1-Very Mild Sensitivity


Headache: 0-None Present


CIWA-Ar Total Score: 14





- Admission Criteria


[facility] Guidelines: Admission for Medically Managed Detox: 


Requires at least one of the followin. CIWA greater than 12


2. Seizures within the past 24 hours


3. Delirium tremens within the past 24 hours


4. Hallucinations within the past 24 hours


5. Acute intervention needed for co  occurring medical disorder


6. Acute intervention needed for co  occurring psychiatric disorder


7. Severe withdrawal that cannot be handled at a lower level of care (continued


    vomiting, continued diarrhea, abnormal vital signs) requiring intravenous


    medication and/or fluids


8. Pregnancy





Patient presents the following: CIWA greater than 12


Admission Criteria Met: Admission criteria met





Admitting History and Physical





- Admission


Chief Complaint: alcohol withdrawal symptoms





- Past Medical History


Pulmonary: Yes: Asthma


...LMP: 20


Heme/Onc: Yes: Anemia


Psych: Yes: Depression





- Past Surgical History


Past Surgical History: Yes: None





- Smoking History


Smoking history: Current every day smoker


Have you smoked in the past 12 months: Yes


Aproximately how many cigarettes per day: 10





- Alcohol/Substance Use


Hx Alcohol Use: Yes


Number of Drinks Daily: 10





- Social History


ADL: Independent


Occupation: unemployed


History of Recent Travel: No





Admission ROS [facility]





- [facility]


Chief Complaint: 





alcohol withdrawal sx 


Allergies/Adverse Reactions: 


                                    Allergies











Allergy/AdvReac Type Severity Reaction Status Date / Time


 


No Known Allergies Allergy   Verified 20 10:30











History of Present Illness: 





Patient is a 44 yo homeless, female with hx of opioid, and alcohol dependence is

here seeking inpatient detox for alcohol withdrawal. Denies any hospitalizations

in the past three months or emergency room visits. Patient denies hx of 

seizures, reports frequent alcohol blackouts. Reports on Suboxone program at 

[facility] on 8mg BID. PMHX: Asthma, GERD. Psych: Bipolar and anxiety. 

Denies suicidal /homicidal ideation. Last detox [facility] 20 -08/10/20, reports

relapsed soon after, is motivated to attend rehabilitation upon completing 

detox. 





Patient Name: Slime Wood Date: 1975


Address: [address] [place] [id number]: Female


Rx Written   Rx Dispensed   Drug   Quantity   Days Supply   Prescriber Name   

Payment Method   Dispenser


2020   buprenorphine-naloxone 8-2 mg sl film   90   30   

Deion Balbuena NP   Insurance   [facility]


2020   buprenorphine-naloxone 8-2 mg sl film   90   30   

Deion Balbuena NP   Insurance   [facility]


10/01/2019   10/02/2019   buprenorphine-naloxone 8-2 mg sl film   30   10   

Rik Durand ([place])   Insurance   [facility]


2019   buprenorphine-naloxone 8-2 mg sl film   60   20   

Rik Durand ([place])   Insurance   [facility]





Patient Name: Slime BryantBirth Date: 1975


Address: [address] [place] [id number]: Female


Rx Written   Rx Dispensed   Drug   Quantity   Days Supply   Prescriber Name   

Payment Method   Dispenser


2020   buprenorphine-naloxone 8-2 mg sl film   60   20   

Deion Balbuena NP   Insurance   [facility]


Exam Limitations: No Limitations





- Review of Systems


Constitutional: Chills, Loss of Appetite, Weakness


EENT: reports: No Symptoms Reported


Respiratory: reports: No Symptoms reported


Cardiac: reports: No Symptoms Reported


GI: reports: Poor Fluid Intake, Indigestion, Abdominal cramping


Musculoskeletal: reports: No Symptoms Reported


Integumentary: reports: No Symptoms Reported


Neuro: reports: Weakness


Endocrine: reports: Excessive Sweating, Increased Thirst


Hematology: reports: No Symptoms Reported


Psychiatric: reports: Orientated x3, Anxious


Other Systems: Reviewed and Negative





Patient History





- Patient Medical History


Hx Anemia: Yes (Not on medication)


Hx Asthma: Yes


Hx Chronic Obstructive Pulmonary Disease (COPD): No


Hx Cancer: No


Hx Cardiac Disorders: No


Hx Congestive Heart Failure: No


Hx Hypertension: No


Hx Hypercholesterolemia: No


Hx Pacemaker: No


HX Cerebrovascular Accident: No


Hx Seizures: No


Hx Dementia: No


Hx Diabetes: No


Hx Gastrointestinal Disorders: Yes


Hx Liver Disease: No


Hx Genitourinary Disorders: No


Hx Sexually Transmitted Disorders: Yes (DOES NOT RECALL)


Hx Renal Disease (ESRD): No


Hx Thyroid Disease: No


Hx Human Immunodeficiency Virus (HIV): No (last tested 20 unknown result)


Hx Hepatitis C: No


Hx Depression: Yes


Hx Suicide Attempt: Yes


Hx Bipolar Disorder: Yes


Hx Schizophrenia: No





- Patient Surgical History


Past Surgical History: No


Hx Neurologic Surgery: No


Hx Cataract Extraction: No


Hx Cardiac Surgery: No


Hx Lung Surgery: No


Hx Breast Surgery: No


Hx Breast Biopsy: No


Hx Abdominal Surgery: No


Hx Appendectomy: No


Hx Cholecystectomy: No


Hx Genitourinary Surgery: No


Hx  Section: No


Hx Orthopedic Surgery: No


Anesthesia Reaction: No





- PPD History


Previous Implant?: Yes


Documented Results: Negative w/proof


Date: 20


Results: 0MM


PPD to be Administered?: No





- Reproductive History


Patient is a Female of Child Bearing Age (11 -55 yrs old): Yes


Last Menstrual Period: 20


Patient Pregnant: No





- Smoking Cessation


Smoking history: Current every day smoker


Have you smoked in the past 12 months: Yes


Aproximately how many cigarettes per day: 10


Hx Chewing Tobacco Use: No


Initiated information on smoking cessation: Yes


'Breaking Loose' booklet given: 20





- Substance & Tx. History


Hx Alcohol Use: Yes


Hx Substance Use: Yes


Substance Use Type: Alcohol, Cocaine


Hx Substance Use Treatment: Yes (Detox [facility] 20 -08/10/20)





- Substances abused


  ** Alcohol


Substance route: Oral


Frequency: Daily


Amount used: 2 pints 


Age of first use: 18


Date of last use: 20





Admission Physical Exam S





- Physical


General Appearance: Yes: Disheveled, Mild Distress, Sweating, Anxious


HEENTM: Yes: EOMI, Hearing grossly Normal, Normal ENT Inspection, Normocephalic,

Normal Voice, ISHMAEL, Pharynx Normal, Tm's normal


Respiratory: Yes: Within Normal Limits


Neck: Yes: No masses,lesions,Nodules, Trachea in good position


Breast: Yes: Breast Exam Deferred


Cardiology: Yes: Regular Rhythm, Regular Rate


Abdominal: Yes: Normal Bowel Sounds, Non Tender, Flat, Soft


Genitourinary: Yes: Within Normal Limits


Back: Yes: Normal Inspection


Musculoskeletal: Yes: full range of Motion, Gait Steady, Pelvis Stable


Extremities: Yes: Normal Capillary Refill, Normal Inspection, Normal Range of 

Motion, Non-Tender


Neurological: Yes: CNs II-XII NML intact, Fully Oriented, Alert, Motor Strength 

5/5, Normal Mood/Affect, Depressed Affect


Integumentary: Yes: Normal Color, Dry, Warm


Lymphatic: Yes: Within Normal Limits





- Diagnostic


(1) Alcohol dependence with withdrawal, uncomplicated


Current Visit: Yes   Status: Acute   





(2) Opioid use disorder, moderate, on maintenance therapy, dependence


Current Visit: Yes   Status: Acute   





(3) Nicotine dependence


Current Visit: Yes   Status: Acute   


Qualifiers: 


   Nicotine product type: cigarettes   Substance use status: in withdrawal   

Qualified Code(s): F17.213 - Nicotine dependence, cigarettes, with withdrawal   





(4) Anemia


Current Visit: Yes   Status: Chronic   


Qualifiers: 


   Anemia type: iron deficiency 





(5) Cannabis dependence


Current Visit: Yes   Status: Chronic   





(6) GERD (gastroesophageal reflux disease)


Current Visit: Yes   Status: Chronic   


Qualifiers: 


   Esophagitis presence: without esophagitis   Qualified Code(s): K21.9 - 

Gastro-esophageal reflux disease without esophagitis   





Cleared for Admission S





- Detox or Rehab


[facility] Level of Care: Medically Managed


Detox Regimen/Protocol: Librium





Breathalyzer





- Breathalyzer


Breathalyzer: 0.161





Urine Drug Screen





- Test Device


Lot number: [id number]


Expiration date: 21





- Control


Is test valid?: Yes





- Results


Drug screen NEGATIVE: No


Urine drug screen results: BZO-Benzodiazepines





Inpatient Rehab Admission





- Rehab Decision to Admit


Inpatient rehab admission?: No

## 2020-09-04 LAB
ALBUMIN SERPL-MCNC: 3.3 G/DL (ref 3.4–5)
ALP SERPL-CCNC: 70 U/L (ref 45–117)
ALT SERPL-CCNC: 29 U/L (ref 13–61)
ANION GAP SERPL CALC-SCNC: 9 MMOL/L (ref 8–16)
APPEARANCE UR: (no result)
AST SERPL-CCNC: 31 U/L (ref 15–37)
BACTERIA # UR AUTO: 2133 /UL (ref 0–1359)
BILIRUB SERPL-MCNC: 0.7 MG/DL (ref 0.2–1)
BILIRUB UR STRIP.AUTO-MCNC: NEGATIVE MG/DL
BUN SERPL-MCNC: 7 MG/DL (ref 7–18)
CALCIUM SERPL-MCNC: 8.7 MG/DL (ref 8.5–10.1)
CASTS URNS QL MICRO: 2 /UL (ref 0–3.1)
CHLORIDE SERPL-SCNC: 107 MMOL/L (ref 98–107)
CO2 SERPL-SCNC: 25 MMOL/L (ref 21–32)
COLOR UR: YELLOW
CREAT SERPL-MCNC: 0.9 MG/DL (ref 0.55–1.3)
DEPRECATED RDW RBC AUTO: 17.8 % (ref 11.6–15.6)
EPITH CASTS URNS QL MICRO: >36 /UL (ref 0–25.1)
GLUCOSE SERPL-MCNC: 102 MG/DL (ref 74–106)
HCT VFR BLD CALC: 36.1 % (ref 32.4–45.2)
HGB BLD-MCNC: 11.6 GM/DL (ref 10.7–15.3)
KETONES UR QL STRIP: NEGATIVE
LEUKOCYTE ESTERASE UR QL STRIP.AUTO: NEGATIVE
MCH RBC QN AUTO: 27.3 PG (ref 25.7–33.7)
MCHC RBC AUTO-ENTMCNC: 32.2 G/DL (ref 32–36)
MCV RBC: 84.7 FL (ref 80–96)
NITRITE UR QL STRIP: NEGATIVE
PH UR: 5.5 [PH] (ref 5–8)
PLATELET # BLD AUTO: 183 K/MM3 (ref 134–434)
PMV BLD: 9.2 FL (ref 7.5–11.1)
POTASSIUM SERPLBLD-SCNC: 3.6 MMOL/L (ref 3.5–5.1)
PROT SERPL-MCNC: 7.2 G/DL (ref 6.4–8.2)
PROT UR QL STRIP: (no result)
PROT UR QL STRIP: NEGATIVE
RBC # BLD AUTO: 4.26 M/MM3 (ref 3.6–5.2)
RBC # BLD AUTO: 6 /UL (ref 0–23.9)
SODIUM SERPL-SCNC: 140 MMOL/L (ref 136–145)
SP GR UR: 1.02 (ref 1.01–1.03)
UROBILINOGEN UR STRIP-MCNC: 1 MG/DL (ref 0.2–1)
WBC # BLD AUTO: 3.4 K/MM3 (ref 4–10)
WBC # UR AUTO: 4 /UL (ref 0–25.8)

## 2020-09-04 RX ADMIN — BACITRACIN ZINC SCH GM: 500 OINTMENT TOPICAL at 22:34

## 2020-09-04 RX ADMIN — Medication SCH MG: at 22:35

## 2020-09-04 RX ADMIN — BACITRACIN ZINC SCH GM: 500 OINTMENT TOPICAL at 11:02

## 2020-09-04 RX ADMIN — CEPHALEXIN SCH MG: 500 CAPSULE ORAL at 23:03

## 2020-09-04 RX ADMIN — CEPHALEXIN SCH MG: 500 CAPSULE ORAL at 18:22

## 2020-09-04 RX ADMIN — Medication PRN APPLIC: at 22:44

## 2020-09-04 RX ADMIN — Medication SCH MG: at 22:34

## 2020-09-04 RX ADMIN — CEPHALEXIN SCH MG: 500 CAPSULE ORAL at 12:22

## 2020-09-04 RX ADMIN — Medication SCH TAB: at 11:02

## 2020-09-04 RX ADMIN — BUPRENORPHINE HYDROCHLORIDE, NALOXONE HYDROCHLORIDE SCH EACH: 8; 2 FILM, SOLUBLE BUCCAL; SUBLINGUAL at 11:02

## 2020-09-04 RX ADMIN — NICOTINE SCH MG: 14 PATCH, EXTENDED RELEASE TRANSDERMAL at 11:05

## 2020-09-04 NOTE — PN
BHS Progress Note


Note: 





                                   Vital Signs











Temperature  97.7 F   09/04/20 12:52


 


Pulse Rate  64   09/04/20 12:52


 


Respiratory Rate  16   09/04/20 12:52


 


Blood Pressure  126/72   09/04/20 12:52


 


O2 Sat by Pulse Oximetry (%)  98   09/04/20 12:52








c/o dry skin 


aveeno soap 


A&D oint 


continue to monitor
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
## 2020-09-05 RX ADMIN — CEPHALEXIN SCH MG: 500 CAPSULE ORAL at 05:39

## 2020-09-05 RX ADMIN — BACITRACIN ZINC SCH GM: 500 OINTMENT TOPICAL at 22:37

## 2020-09-05 RX ADMIN — CEPHALEXIN SCH MG: 500 CAPSULE ORAL at 13:03

## 2020-09-05 RX ADMIN — CEPHALEXIN SCH MG: 500 CAPSULE ORAL at 18:37

## 2020-09-05 RX ADMIN — BUPRENORPHINE HYDROCHLORIDE, NALOXONE HYDROCHLORIDE SCH EACH: 8; 2 FILM, SOLUBLE BUCCAL; SUBLINGUAL at 10:24

## 2020-09-05 RX ADMIN — Medication SCH MG: at 22:36

## 2020-09-05 RX ADMIN — NICOTINE SCH MG: 14 PATCH, EXTENDED RELEASE TRANSDERMAL at 10:24

## 2020-09-05 RX ADMIN — BACITRACIN ZINC SCH GM: 500 OINTMENT TOPICAL at 10:23

## 2020-09-05 RX ADMIN — ALUMINUM HYDROXIDE, MAGNESIUM HYDROXIDE, AND SIMETHICONE PRN ML: 200; 200; 20 SUSPENSION ORAL at 18:37

## 2020-09-05 RX ADMIN — Medication SCH TAB: at 10:23

## 2020-09-05 RX ADMIN — Medication SCH MG: at 22:37

## 2020-09-05 RX ADMIN — NICOTINE POLACRILEX PRN MG: 2 GUM, CHEWING ORAL at 13:03

## 2020-09-05 NOTE — PN
S CIWA





- CIWA Score


Nausea/Vomitin-No Nausea/No Vomiting


Muscle Tremors: 2


Anxiety: 3


Agitation: 0-Normal Activity


Paroxysmal Sweats: 3


Orientation: 0-Oriented


Tacttile Disturbances: 0-None


Auditory Disturbances: 0-None


Visual Disturbances: 0-None


Headache: 2-Mild


CIWA-Ar Total Score: 10





BHS Progress Note (SOAP)


Subjective: 





c/o sweats, anxiety, shakes, and headache.


Objective: 





20 10:37


                                   Vital Signs











  20





  06:35 08:50


 


Temperature 97.6 F 96.1 F L


 


Pulse Rate 84 81


 


Respiratory 18 18





Rate  


 


Blood Pressure 106/65 101/68


 


O2 Sat by Pulse 96 96





Oximetry (%)  








                             Laboratory Last Values











WBC  3.4 K/mm3 (4.0-10.0)  L  20  08:40    


 


RBC  4.26 M/mm3 (3.60-5.2)   20  08:40    


 


Hgb  11.6 GM/dL (10.7-15.3)   20  08:40    


 


Hct  36.1 % (32.4-45.2)   20  08:40    


 


MCV  84.7 fl (80-96)   20  08:40    


 


MCH  27.3 pg (25.7-33.7)   20  08:40    


 


MCHC  32.2 g/dl (32.0-36.0)   20  08:40    


 


RDW  17.8 % (11.6-15.6)  H  20  08:40    


 


Plt Count  183 K/MM3 (134-434)   20  08:40    


 


MPV  9.2 fl (7.5-11.1)   20  08:40    


 


Sodium  140 mmol/L (136-145)   20  08:40    


 


Potassium  3.6 mmol/L (3.5-5.1)   20  08:40    


 


Chloride  107 mmol/L ()   20  08:40    


 


Carbon Dioxide  25 mmol/L (21-32)   20  08:40    


 


Anion Gap  9 MMOL/L (8-16)   20  08:40    


 


BUN  7.0 mg/dL (7-18)   20  08:40    


 


Creatinine  0.9 mg/dL (0.55-1.3)   20  08:40    


 


Est GFR (CKD-EPI)AfAm  90.13   20  08:40    


 


Est GFR (CKD-EPI)NonAf  77.76   20  08:40    


 


Random Glucose  102 mg/dL ()   20  08:40    


 


Calcium  8.7 mg/dL (8.5-10.1)   20  08:40    


 


Total Bilirubin  0.7 mg/dL (0.2-1)   20  08:40    


 


AST  31 U/L (15-37)   20  08:40    


 


ALT  29 U/L (13-61)   20  08:40    


 


Alkaline Phosphatase  70 U/L ()   20  08:40    


 


Total Protein  7.2 g/dl (6.4-8.2)   20  08:40    


 


Albumin  3.3 g/dl (3.4-5.0)  L  20  08:40    


 


Urine Color  Yellow   20  15:38    


 


Urine Appearance  Cloudy   20  15:38    


 


Urine pH  5.5  (5.0-8.0)   20  15:38    


 


Ur Specific Gravity  1.022  (1.010-1.035)   20  15:38    


 


Urine Protein  1+  (NEGATIVE)  H  20  15:38    


 


Urine Glucose (UA)  Negative  (NEGATIVE)   20  15:38    


 


Urine Ketones  Negative  (NEGATIVE)   20  15:38    


 


Urine Blood  Negative  (NEGATIVE)   20  15:38    


 


Urine Nitrite  Negative  (NEGATIVE)   20  15:38    


 


Urine Bilirubin  Negative  (NEGATIVE)   20  15:38    


 


Urine Urobilinogen  1.0 mg/dL (0.2-1.0)   20  15:38    


 


Ur Leukocyte Esterase  Negative  (NEGATIVE)   20  15:38    


 


Urine WBC (Auto)  4 /uL (0-25.8)   20  15:38    


 


Urine RBC (Auto)  6 /uL (0-23.9)   20  15:38    


 


Urine Casts (Auto)  2 /uL (0-3.1)   20  15:38    


 


U Epithel Cells (Auto)  >36 /uL (0-25.1)   20  15:38    


 


Urine Bacteria (Auto)  2133 /uL (0-1359)   20  15:38    


 


POC Urine HCG, Qual  Negative   20  20:07    


 


Syphilis Serology  Non-reactive  (NONREACTIVE)   20  08:40    








Labs noted.


Assessment: 





20 10:37


AOX3, in no acute respiratory distress.


Full ROM, ambulating in the unit.


Withdrawal symptoms.


Plan: 





continue detox.

## 2020-09-06 RX ADMIN — Medication SCH MG: at 22:08

## 2020-09-06 RX ADMIN — BUPRENORPHINE HYDROCHLORIDE, NALOXONE HYDROCHLORIDE SCH EACH: 8; 2 FILM, SOLUBLE BUCCAL; SUBLINGUAL at 10:15

## 2020-09-06 RX ADMIN — Medication SCH TAB: at 10:15

## 2020-09-06 RX ADMIN — Medication PRN APPLIC: at 22:10

## 2020-09-06 RX ADMIN — CEPHALEXIN SCH MG: 500 CAPSULE ORAL at 17:22

## 2020-09-06 RX ADMIN — BACITRACIN ZINC SCH GM: 500 OINTMENT TOPICAL at 22:09

## 2020-09-06 RX ADMIN — NICOTINE SCH MG: 14 PATCH, EXTENDED RELEASE TRANSDERMAL at 10:15

## 2020-09-06 RX ADMIN — BACITRACIN ZINC SCH GM: 500 OINTMENT TOPICAL at 10:15

## 2020-09-06 RX ADMIN — CEPHALEXIN SCH MG: 500 CAPSULE ORAL at 01:00

## 2020-09-06 RX ADMIN — CEPHALEXIN SCH MG: 500 CAPSULE ORAL at 13:03

## 2020-09-06 RX ADMIN — CEPHALEXIN SCH MG: 500 CAPSULE ORAL at 05:43

## 2020-09-06 NOTE — PN
S CIWA





- CIWA Score


Nausea/Vomitin-Mild Nausea/No Vomiting


Muscle Tremors: 2


Anxiety: 1-Mildly Anxious


Agitation: 0-Normal Activity


Paroxysmal Sweats: 2


Orientation: 0-Oriented


Tacttile Disturbances: 0-None


Auditory Disturbances: 0-None


Visual Disturbances: 0-None


Headache: 2-Mild


CIWA-Ar Total Score: 8





BHS Progress Note (SOAP)


Subjective: 





45 years old female was admitted on 20 for alcohol withdrawal sx 

management 


treating with librium detox regiment


taking suboxone 8-2 mg sl bid due to negative suboxon urine tox upon admission


begin suboxone 8-2 mg sl po daily 


ms hooper is willing return to to suboxone program for behavior and 

psychosocial therapies 


Objective: 





20 14:07


                               Vital Signs - 24 hr











  20





  16:19 20:43 06:24


 


Temperature 97.1 F L 97.5 F L 98.0 F


 


Pulse Rate 66 101 H 70


 


Respiratory 18 18 16





Rate   


 


Blood Pressure 124/79 117/87 99/59 L


 


O2 Sat by Pulse 98 100 100





Oximetry (%)   














  20





  08:52 13:25


 


Temperature 98.3 F 97.1 F L


 


Pulse Rate 78 106 H


 


Respiratory 18 18





Rate  


 


Blood Pressure 119/52 L 116/76


 


O2 Sat by Pulse 100 100





Oximetry (%)  








                                Laboratory Tests











  20





  10:30 20:07 08:40


 


WBC   


 


RBC   


 


Hgb   


 


Hct   


 


MCV   


 


MCH   


 


MCHC   


 


RDW   


 


Plt Count   


 


MPV   


 


Sodium   


 


Potassium   


 


Chloride   


 


Carbon Dioxide   


 


Anion Gap   


 


BUN   


 


Creatinine   


 


Est GFR (CKD-EPI)AfAm   


 


Est GFR (CKD-EPI)NonAf   


 


Random Glucose   


 


Calcium   


 


Total Bilirubin   


 


AST   


 


ALT   


 


Alkaline Phosphatase   


 


Total Protein   


 


Albumin   


 


Urine Color   


 


Urine Appearance   


 


Urine pH   


 


Ur Specific Gravity   


 


Urine Protein   


 


Urine Glucose (UA)   


 


Urine Ketones   


 


Urine Blood   


 


Urine Nitrite   


 


Urine Bilirubin   


 


Urine Urobilinogen   


 


Ur Leukocyte Esterase   


 


Urine WBC (Auto)   


 


Urine RBC (Auto)   


 


Urine Casts (Auto)   


 


U Epithel Cells (Auto)   


 


Urine Bacteria (Auto)   


 


POC Urine HCG, Qual   Negative 


 


Syphilis Serology    Non-reactive


 


COVID-19 ([facility])  Not detected  














  20





  08:40 08:40 15:38


 


WBC  3.4 L  


 


RBC  4.26  


 


Hgb  11.6  


 


Hct  36.1  


 


MCV  84.7  


 


MCH  27.3  


 


MCHC  32.2  


 


RDW  17.8 H  


 


Plt Count  183  


 


MPV  9.2  


 


Sodium   140 


 


Potassium   3.6 


 


Chloride   107 


 


Carbon Dioxide   25 


 


Anion Gap   9 


 


BUN   7.0 


 


Creatinine   0.9 


 


Est GFR (CKD-EPI)AfAm   90.13 


 


Est GFR (CKD-EPI)NonAf   77.76 


 


Random Glucose   102 


 


Calcium   8.7 


 


Total Bilirubin   0.7 


 


AST   31 


 


ALT   29 


 


Alkaline Phosphatase   70 


 


Total Protein   7.2 


 


Albumin   3.3 L 


 


Urine Color    Yellow


 


Urine Appearance    Cloudy


 


Urine pH    5.5


 


Ur Specific Gravity    1.022


 


Urine Protein    1+ H


 


Urine Glucose (UA)    Negative


 


Urine Ketones    Negative


 


Urine Blood    Negative


 


Urine Nitrite    Negative


 


Urine Bilirubin    Negative


 


Urine Urobilinogen    1.0


 


Ur Leukocyte Esterase    Negative


 


Urine WBC (Auto)    4


 


Urine RBC (Auto)    6


 


Urine Casts (Auto)    2


 


U Epithel Cells (Auto)    >36


 


Urine Bacteria (Auto)    2133


 


POC Urine HCG, Qual   


 


Syphilis Serology   


 


COVID-19 ([facility])   








possible ua specimen contamination 


Assessment: 





20 14:11


alcohol withdrawal 


Plan: 





librium regiment

## 2020-09-07 LAB
APPEARANCE UR: (no result)
BILIRUB UR STRIP.AUTO-MCNC: NEGATIVE MG/DL
COLOR UR: YELLOW
KETONES UR QL STRIP: NEGATIVE
LEUKOCYTE ESTERASE UR QL STRIP.AUTO: NEGATIVE
NITRITE UR QL STRIP: NEGATIVE
PH UR: 5 [PH] (ref 5–8)
PROT UR QL STRIP: NEGATIVE
PROT UR QL STRIP: NEGATIVE
SP GR UR: 1.01 (ref 1.01–1.03)
UROBILINOGEN UR STRIP-MCNC: 0.2 MG/DL (ref 0.2–1)

## 2020-09-07 RX ADMIN — Medication SCH MG: at 22:30

## 2020-09-07 RX ADMIN — BACITRACIN ZINC SCH GM: 500 OINTMENT TOPICAL at 10:32

## 2020-09-07 RX ADMIN — BUPRENORPHINE HYDROCHLORIDE, NALOXONE HYDROCHLORIDE SCH EACH: 8; 2 FILM, SOLUBLE BUCCAL; SUBLINGUAL at 10:35

## 2020-09-07 RX ADMIN — Medication SCH TAB: at 10:31

## 2020-09-07 RX ADMIN — NICOTINE POLACRILEX PRN MG: 2 GUM, CHEWING ORAL at 13:15

## 2020-09-07 RX ADMIN — Medication PRN APPLIC: at 22:32

## 2020-09-07 RX ADMIN — ALUMINUM HYDROXIDE, MAGNESIUM HYDROXIDE, AND SIMETHICONE PRN ML: 200; 200; 20 SUSPENSION ORAL at 22:32

## 2020-09-07 RX ADMIN — ALUMINUM HYDROXIDE, MAGNESIUM HYDROXIDE, AND SIMETHICONE PRN ML: 200; 200; 20 SUSPENSION ORAL at 01:12

## 2020-09-07 RX ADMIN — BACITRACIN ZINC SCH GM: 500 OINTMENT TOPICAL at 22:30

## 2020-09-07 RX ADMIN — NICOTINE SCH MG: 14 PATCH, EXTENDED RELEASE TRANSDERMAL at 10:32

## 2020-09-07 NOTE — PN
S CIWA





- CIWA Score


Nausea/Vomitin-Mild Nausea/No Vomiting


Muscle Tremors: 1-None Visible, but Felt


Anxiety: 2


Agitation: 0-Normal Activity


Paroxysmal Sweats: 1-Minimal Palms Moist


Orientation: 0-Oriented


Tacttile Disturbances: 0-None


Auditory Disturbances: 0-None


Visual Disturbances: 1-Very Mild Sensitivity


Headache: 0-None Present


CIWA-Ar Total Score: 6





BHS Progress Note (SOAP)


Subjective: 





45 years old female was admitted on 20 for alcohol withdrawal sx 

management


treating with librium detox regiment


feels nausea after breakfast


zofran 4 mg sl x 1





discussing aftercare with staff ms hooper prefers [place] for alcohol abuse 

treatment 


Objective: 





20 10:23


                               Vital Signs - 24 hr











  20





  13:25 16:50 20:33


 


Temperature 97.1 F L 98.0 F 97.2 F L


 


Pulse Rate 106 H 85 70


 


Respiratory 18 18 16





Rate   


 


Blood Pressure 116/76 142/98 135/92


 


O2 Sat by Pulse 100  100





Oximetry (%)   














  20





  06:56 08:56


 


Temperature 97.0 F L 97.5 F L


 


Pulse Rate 82 85


 


Respiratory 18 18





Rate  


 


Blood Pressure 119/83 109/82


 


O2 Sat by Pulse 100 





Oximetry (%)  








                                Laboratory Tests











  20





  10:30 20:07 08:40


 


WBC   


 


RBC   


 


Hgb   


 


Hct   


 


MCV   


 


MCH   


 


MCHC   


 


RDW   


 


Plt Count   


 


MPV   


 


Sodium   


 


Potassium   


 


Chloride   


 


Carbon Dioxide   


 


Anion Gap   


 


BUN   


 


Creatinine   


 


Est GFR (CKD-EPI)AfAm   


 


Est GFR (CKD-EPI)NonAf   


 


Random Glucose   


 


Calcium   


 


Total Bilirubin   


 


AST   


 


ALT   


 


Alkaline Phosphatase   


 


Total Protein   


 


Albumin   


 


Urine Color   


 


Urine Appearance   


 


Urine pH   


 


Ur Specific Gravity   


 


Urine Protein   


 


Urine Glucose (UA)   


 


Urine Ketones   


 


Urine Blood   


 


Urine Nitrite   


 


Urine Bilirubin   


 


Urine Urobilinogen   


 


Ur Leukocyte Esterase   


 


Urine WBC (Auto)   


 


Urine RBC (Auto)   


 


Urine Casts (Auto)   


 


U Epithel Cells (Auto)   


 


Urine Bacteria (Auto)   


 


POC Urine HCG, Qual   Negative 


 


Syphilis Serology    Non-reactive


 


COVID-19 ([facility])  Not detected  














  20





  08:40 08:40 15:38


 


WBC  3.4 L  


 


RBC  4.26  


 


Hgb  11.6  


 


Hct  36.1  


 


MCV  84.7  


 


MCH  27.3  


 


MCHC  32.2  


 


RDW  17.8 H  


 


Plt Count  183  


 


MPV  9.2  


 


Sodium   140 


 


Potassium   3.6 


 


Chloride   107 


 


Carbon Dioxide   25 


 


Anion Gap   9 


 


BUN   7.0 


 


Creatinine   0.9 


 


Est GFR (CKD-EPI)AfAm   90.13 


 


Est GFR (CKD-EPI)NonAf   77.76 


 


Random Glucose   102 


 


Calcium   8.7 


 


Total Bilirubin   0.7 


 


AST   31 


 


ALT   29 


 


Alkaline Phosphatase   70 


 


Total Protein   7.2 


 


Albumin   3.3 L 


 


Urine Color    Yellow


 


Urine Appearance    Cloudy


 


Urine pH    5.5


 


Ur Specific Gravity    1.022


 


Urine Protein    1+ H


 


Urine Glucose (UA)    Negative


 


Urine Ketones    Negative


 


Urine Blood    Negative


 


Urine Nitrite    Negative


 


Urine Bilirubin    Negative


 


Urine Urobilinogen    1.0


 


Ur Leukocyte Esterase    Negative


 


Urine WBC (Auto)    4


 


Urine RBC (Auto)    6


 


Urine Casts (Auto)    2


 


U Epithel Cells (Auto)    >36


 


Urine Bacteria (Auto)    2133


 


POC Urine HCG, Qual   


 


Syphilis Serology   


 


COVID-19 ([facility])   








rule out urine specimen contamination


20 10:24


repeat ua


Assessment: 





20 10:24


alcohol withdrawal 


Plan: 





librium regiment

## 2020-09-08 ENCOUNTER — HOSPITAL ENCOUNTER (INPATIENT)
Dept: HOSPITAL 74 - YASAS | Age: 45
LOS: 23 days | Discharge: HOME | DRG: 772 | End: 2020-10-01
Attending: ALLERGY & IMMUNOLOGY | Admitting: ALLERGY & IMMUNOLOGY
Payer: COMMERCIAL

## 2020-09-08 VITALS — SYSTOLIC BLOOD PRESSURE: 132 MMHG | HEART RATE: 87 BPM | TEMPERATURE: 97.3 F | DIASTOLIC BLOOD PRESSURE: 89 MMHG

## 2020-09-08 DIAGNOSIS — F12.20: ICD-10-CM

## 2020-09-08 DIAGNOSIS — F17.210: ICD-10-CM

## 2020-09-08 DIAGNOSIS — Z79.899: ICD-10-CM

## 2020-09-08 DIAGNOSIS — D64.9: ICD-10-CM

## 2020-09-08 DIAGNOSIS — F41.9: ICD-10-CM

## 2020-09-08 DIAGNOSIS — Z56.0: ICD-10-CM

## 2020-09-08 DIAGNOSIS — Z51.81: ICD-10-CM

## 2020-09-08 DIAGNOSIS — K21.9: ICD-10-CM

## 2020-09-08 DIAGNOSIS — F19.24: ICD-10-CM

## 2020-09-08 DIAGNOSIS — Z62.810: ICD-10-CM

## 2020-09-08 DIAGNOSIS — Z59.0: ICD-10-CM

## 2020-09-08 DIAGNOSIS — F31.9: ICD-10-CM

## 2020-09-08 DIAGNOSIS — F11.20: ICD-10-CM

## 2020-09-08 DIAGNOSIS — F10.20: Primary | ICD-10-CM

## 2020-09-08 DIAGNOSIS — G47.00: ICD-10-CM

## 2020-09-08 DIAGNOSIS — J45.20: ICD-10-CM

## 2020-09-08 PROCEDURE — HZ42ZZZ GROUP COUNSELING FOR SUBSTANCE ABUSE TREATMENT, COGNITIVE-BEHAVIORAL: ICD-10-PCS | Performed by: ALLERGY & IMMUNOLOGY

## 2020-09-08 RX ADMIN — Medication SCH MG: at 21:19

## 2020-09-08 RX ADMIN — Medication SCH TAB: at 10:19

## 2020-09-08 RX ADMIN — NICOTINE SCH MG: 14 PATCH, EXTENDED RELEASE TRANSDERMAL at 10:20

## 2020-09-08 RX ADMIN — Medication SCH: at 18:25

## 2020-09-08 RX ADMIN — Medication SCH MG: at 21:18

## 2020-09-08 RX ADMIN — BACITRACIN ZINC SCH GM: 500 OINTMENT TOPICAL at 21:21

## 2020-09-08 RX ADMIN — Medication SCH APPLIC: at 12:49

## 2020-09-08 RX ADMIN — NICOTINE POLACRILEX PRN MG: 2 GUM, CHEWING ORAL at 10:22

## 2020-09-08 RX ADMIN — BACITRACIN ZINC SCH GM: 500 OINTMENT TOPICAL at 10:19

## 2020-09-08 RX ADMIN — LOPERAMIDE HYDROCHLORIDE PRN MG: 2 CAPSULE ORAL at 12:52

## 2020-09-08 RX ADMIN — BUPRENORPHINE HYDROCHLORIDE, NALOXONE HYDROCHLORIDE SCH EACH: 8; 2 FILM, SOLUBLE BUCCAL; SUBLINGUAL at 10:19

## 2020-09-08 RX ADMIN — ALUMINUM HYDROXIDE, MAGNESIUM HYDROXIDE, AND SIMETHICONE PRN ML: 200; 200; 20 SUSPENSION ORAL at 04:24

## 2020-09-08 SDOH — ECONOMIC STABILITY - INCOME SECURITY: UNEMPLOYMENT, UNSPECIFIED: Z56.0

## 2020-09-08 SDOH — ECONOMIC STABILITY - HOUSING INSECURITY: HOMELESSNESS: Z59.0

## 2020-09-08 NOTE — HP
BHS MD Rehab Assess/Revision





- Admission History


Admitted to Rehab from: [facility]


Date of Admission to Rehab: 09/08/20





- Findings


Detox History & Physical reviewed: Yes


Concur with findings: Yes


Comments/Additional Findings: transferred from detox to rehab admission as per 

protocol





Inpatient Rehab Admission





- Rehab Decision to Admit


Inpatient rehab admission?: Yes





- Initial Determination


Are CD services needed?: Yes


Free of communicable disease: Yes


Not in need of hospitalization: Yes





- Rehab Admission Criteria


Previous failed treatment: Yes


Poor recovery environment: Yes


Comorbidities: Yes


Lacks judgement: Yes


Patient is meeting Inpatient Rehab admission criteria:: Yes

## 2020-09-08 NOTE — DS
BHS Detox Discharge Summary


Admission Date: 


20





Discharge Date: 20





- History


Present History: Alcohol Dependence


Additional Comments: 





45 years old female was admitted on 20 for alcohol withdrawal sx 

management


treated with librium detox regiment


seen by psychiatrist lily livingston 


ms hooper has completed the librum regiment and is tolerated well





General Appearance: Yes: good hygiene, no Distress, not Sweating, mild Anxious


HEENTM: Yes: EOMI, Hearing grossly Normal, Normal ENT Inspection, Normocephalic,

Normal Voice, ISHMAEL, Pharynx Normal, Tm's normal


Respiratory: Yes: Within Normal Limits


Neck: Yes: No masses,lesions,Nodules, Trachea in good position


Breast: Yes: Breast Exam Deferred


Cardiology: Yes: Regular Rhythm, Regular Rate


Abdominal: Yes: Normal Bowel Sounds, Non Tender, Flat, Soft


Genitourinary: Yes: Within Normal Limits


Back: Yes: Normal Inspection


Musculoskeletal: Yes: full range of Motion, Gait Steady, Pelvis Stable


Extremities: Yes: Normal Capillary Refill, Normal Inspection, Normal Range of 

Motion, Non-Tender


Neurological: Yes: CNs II-XII NML intact, Fully Oriented, Alert, Motor Strength 

5/5, Normal Mood/Affect, Depressed Affect


Integumentary: Yes: Normal Color, Dry, Warm


Lymphatic: Yes: Within Normal Limits


Pertinent Past History: 





time for discharge 45 minutes


transferred order set from detox to rehab





- Physical Exam Results


Vital Signs: 


                                   Vital Signs











Temperature  97.3 F L  20 08:50


 


Pulse Rate  87   20 08:50


 


Respiratory Rate  18   20 08:50


 


Blood Pressure  132/89   20 08:50


 


O2 Sat by Pulse Oximetry (%)  95   20 05:46











Pertinent Admission Physical Exam Findings: 





alcohol withdrawal 


                                Laboratory Tests











  20





  10:30 20:07 08:40


 


WBC   


 


RBC   


 


Hgb   


 


Hct   


 


MCV   


 


MCH   


 


MCHC   


 


RDW   


 


Plt Count   


 


MPV   


 


Sodium   


 


Potassium   


 


Chloride   


 


Carbon Dioxide   


 


Anion Gap   


 


BUN   


 


Creatinine   


 


Est GFR (CKD-EPI)AfAm   


 


Est GFR (CKD-EPI)NonAf   


 


Random Glucose   


 


Calcium   


 


Total Bilirubin   


 


AST   


 


ALT   


 


Alkaline Phosphatase   


 


Total Protein   


 


Albumin   


 


Urine Color   


 


Urine Appearance   


 


Urine pH   


 


Ur Specific Gravity   


 


Urine Protein   


 


Urine Glucose (UA)   


 


Urine Ketones   


 


Urine Blood   


 


Urine Nitrite   


 


Urine Bilirubin   


 


Urine Urobilinogen   


 


Ur Leukocyte Esterase   


 


Urine WBC (Auto)   


 


Urine RBC (Auto)   


 


Urine Casts (Auto)   


 


U Epithel Cells (Auto)   


 


Urine Bacteria (Auto)   


 


POC Urine HCG, Qual   Negative 


 


Syphilis Serology    Non-reactive


 


COVID-19 ([facility])  Not detected  














  20





  08:40 08:40 15:38


 


WBC  3.4 L  


 


RBC  4.26  


 


Hgb  11.6  


 


Hct  36.1  


 


MCV  84.7  


 


MCH  27.3  


 


MCHC  32.2  


 


RDW  17.8 H  


 


Plt Count  183  


 


MPV  9.2  


 


Sodium   140 


 


Potassium   3.6 


 


Chloride   107 


 


Carbon Dioxide   25 


 


Anion Gap   9 


 


BUN   7.0 


 


Creatinine   0.9 


 


Est GFR (CKD-EPI)AfAm   90.13 


 


Est GFR (CKD-EPI)NonAf   77.76 


 


Random Glucose   102 


 


Calcium   8.7 


 


Total Bilirubin   0.7 


 


AST   31 


 


ALT   29 


 


Alkaline Phosphatase   70 


 


Total Protein   7.2 


 


Albumin   3.3 L 


 


Urine Color    Yellow


 


Urine Appearance    Cloudy


 


Urine pH    5.5


 


Ur Specific Gravity    1.022


 


Urine Protein    1+ H


 


Urine Glucose (UA)    Negative


 


Urine Ketones    Negative


 


Urine Blood    Negative


 


Urine Nitrite    Negative


 


Urine Bilirubin    Negative


 


Urine Urobilinogen    1.0


 


Ur Leukocyte Esterase    Negative


 


Urine WBC (Auto)    4


 


Urine RBC (Auto)    6


 


Urine Casts (Auto)    2


 


U Epithel Cells (Auto)    >36


 


Urine Bacteria (Auto)    2133


 


POC Urine HCG, Qual   


 


Syphilis Serology   


 


COVID-19 ([facility])   














  20





  Unknown


 


WBC 


 


RBC 


 


Hgb 


 


Hct 


 


MCV 


 


MCH 


 


MCHC 


 


RDW 


 


Plt Count 


 


MPV 


 


Sodium 


 


Potassium 


 


Chloride 


 


Carbon Dioxide 


 


Anion Gap 


 


BUN 


 


Creatinine 


 


Est GFR (CKD-EPI)AfAm 


 


Est GFR (CKD-EPI)NonAf 


 


Random Glucose 


 


Calcium 


 


Total Bilirubin 


 


AST 


 


ALT 


 


Alkaline Phosphatase 


 


Total Protein 


 


Albumin 


 


Urine Color  Yellow


 


Urine Appearance  Cloudy


 


Urine pH  5.0


 


Ur Specific Gravity  1.009 L


 


Urine Protein  Negative


 


Urine Glucose (UA)  Negative


 


Urine Ketones  Negative


 


Urine Blood  Negative


 


Urine Nitrite  Negative


 


Urine Bilirubin  Negative


 


Urine Urobilinogen  0.2


 


Ur Leukocyte Esterase  Negative


 


Urine WBC (Auto) 


 


Urine RBC (Auto) 


 


Urine Casts (Auto) 


 


U Epithel Cells (Auto) 


 


Urine Bacteria (Auto) 


 


POC Urine HCG, Qual 


 


Syphilis Serology 


 


COVID-19 ([facility]) 








negative uti 





- Treatment


Hospital Course: Detox Protocol Followed, Detoxed Safely, Responded well, 

Discharged Condition Good, Rehab Referral Accepted


Patient [facility] Accepted [facility] Referral to[facility] 





- Medication


Discharge Medications: 


Ambulatory Orders





Albuterol Sulfate Inhaler - [Ventolin HFA Inhaler -] 2 inhaler PO TID 20 


Bacitracin - [Bacitracin Topical Ointment -] 1 applic TID 20 


Budesonide/Formeterol Fumarate [SYMBICORT 80/4.5mcg -] 1 puff IH BID 20 


Buprenorphine/Naloxone [Suboxone 8Mg/2Mg Sl Film -] 1 film SL BID 20 


Cephalexin [Keflex] 500 mg PO TID 20 











- Diagnosis


(1) Alcohol dependence with withdrawal, uncomplicated


Current Visit: Yes   Status: Acute   





(2) GERD (gastroesophageal reflux disease)


Current Visit: Yes   Status: Chronic   


Qualifiers: 


   Esophagitis presence: without esophagitis   Qualified Code(s): K21.9 - 

Gastro-esophageal reflux disease without esophagitis   





(3) Nicotine dependence


Current Visit: Yes   Status: Acute   


Qualifiers: 


   Nicotine product type: cigarettes   Substance use status: in withdrawal   

Qualified Code(s): F17.213 - Nicotine dependence, cigarettes, with withdrawal   





(4) Substance induced mood disorder


Current Visit: Yes   Status: Suspected   





(5) Asthma


Current Visit: Yes   Status: Chronic   


Qualifiers: 


   Asthma severity: mild   Asthma persistence: intermittent   Asthma 

complication type: with status asthmaticus   Qualified Code(s): J45.22 - Mild 

intermittent asthma with status asthmaticus   





(6) Encounter for monitoring Suboxone maintenance therapy


Current Visit: Yes   Status: Chronic   





(7) Substance induced mood disorder


Current Visit: Yes   Status: Suspected   





- AMA


Did Patient Leave Against Medical Advice: No





CIWA Score





- CIWA Score


Nausea/Vomitin-Mild Nausea/No Vomiting


Muscle Tremors: 1-None Visible, but Felt


Anxiety: 1-Mildly Anxious


Agitation: 0-Normal Activity


Paroxysmal Sweats: No Perspiration


Orientation: 0-Oriented


Tacttile Disturbances: 0-None


Auditory Disturbances: 0-None


Visual Disturbances: 0-None


Headache: 0-None Present


CIWA-Ar Total Score: 3

## 2020-09-08 NOTE — PN
BHS Progress Note


Note: 





Pt is a 44 y/o female admitted to rehab from [facility]. Reports tiredness 

and wants to rest, post detox.


PMHx:Asthma(uses albuterol inh)


                                        


                               Vital Signs - 24 hr











  09/08/20 09/08/20





  11:47 13:43


 


Temperature 98.2 F 


 


Pulse Rate 90 


 


Respiratory 18 





Rate  


 


Blood Pressure 112/81 


 


O2 Sat by Pulse  100





Oximetry (%)  








Alert o x 3


nad


pt seen in bed, denies discomfort"I just got out of bed now to get my snack". 





s/p detox





cont rehab


increase po fluids

## 2020-09-09 RX ADMIN — DIPHENOXYLATE HYDROCHLORIDE AND ATROPINE SULFATE PRN COMBO: 2.5; .025 TABLET ORAL at 14:34

## 2020-09-09 RX ADMIN — LOPERAMIDE HYDROCHLORIDE PRN MG: 2 CAPSULE ORAL at 02:25

## 2020-09-09 RX ADMIN — Medication SCH: at 06:35

## 2020-09-09 RX ADMIN — Medication SCH MG: at 21:32

## 2020-09-09 RX ADMIN — QUETIAPINE FUMARATE SCH MG: 100 TABLET ORAL at 21:32

## 2020-09-09 RX ADMIN — BUPRENORPHINE HYDROCHLORIDE, NALOXONE HYDROCHLORIDE SCH EACH: 8; 2 FILM, SOLUBLE BUCCAL; SUBLINGUAL at 09:58

## 2020-09-09 RX ADMIN — Medication SCH: at 12:10

## 2020-09-09 RX ADMIN — Medication SCH: at 18:04

## 2020-09-09 RX ADMIN — BACITRACIN ZINC SCH GM: 500 OINTMENT TOPICAL at 21:33

## 2020-09-09 RX ADMIN — BACITRACIN ZINC SCH GM: 500 OINTMENT TOPICAL at 09:57

## 2020-09-09 RX ADMIN — NICOTINE SCH MG: 14 PATCH, EXTENDED RELEASE TRANSDERMAL at 09:58

## 2020-09-09 RX ADMIN — Medication SCH TAB: at 09:57

## 2020-09-09 RX ADMIN — Medication SCH MG: at 21:31

## 2020-09-09 RX ADMIN — Medication SCH: at 00:23
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
## 2020-09-09 NOTE — PN
BHS Progress Note


Note: 





Pt c/o  withdrawal sx-diarrhea, nausea and vomiting. Pt is s/p detox. Reports 

has been given Imodium with no relief.  


                               Vital Signs - 24 hr











  09/08/20 09/08/20 09/08/20





  11:47 13:43 20:25


 


Temperature 98.2 F  


 


Pulse Rate 90  


 


Respiratory 18  





Rate   


 


Blood Pressure 112/81  


 


O2 Sat by Pulse  100 100





Oximetry (%)   














  09/09/20





  06:14


 


Temperature 97.8 F


 


Pulse Rate 90


 


Respiratory 20





Rate 


 


Blood Pressure 130/89


 


O2 Sat by Pulse 100





Oximetry (%) 








Alert o x 3


nad


oob ambulating with steady gait








S/P detox


protracted w/s





Lomotil 1 tab po tid prn for diarrhea.


zofran prn as directed

## 2020-09-10 RX ADMIN — BACITRACIN ZINC SCH GM: 500 OINTMENT TOPICAL at 10:30

## 2020-09-10 RX ADMIN — BACITRACIN ZINC SCH GM: 500 OINTMENT TOPICAL at 21:19

## 2020-09-10 RX ADMIN — NICOTINE SCH MG: 14 PATCH, EXTENDED RELEASE TRANSDERMAL at 10:30

## 2020-09-10 RX ADMIN — Medication SCH: at 00:51

## 2020-09-10 RX ADMIN — BUPRENORPHINE HYDROCHLORIDE, NALOXONE HYDROCHLORIDE SCH EACH: 8; 2 FILM, SOLUBLE BUCCAL; SUBLINGUAL at 10:30

## 2020-09-10 RX ADMIN — Medication SCH MG: at 21:19

## 2020-09-10 RX ADMIN — Medication SCH TAB: at 10:30

## 2020-09-10 RX ADMIN — Medication SCH MG: at 21:18

## 2020-09-10 RX ADMIN — QUETIAPINE FUMARATE SCH MG: 100 TABLET ORAL at 21:18

## 2020-09-10 RX ADMIN — NICOTINE POLACRILEX PRN MG: 2 GUM, CHEWING ORAL at 10:33

## 2020-09-10 RX ADMIN — Medication SCH APPLIC: at 06:24

## 2020-09-10 RX ADMIN — Medication SCH: at 12:40

## 2020-09-10 RX ADMIN — Medication SCH: at 18:17

## 2020-09-11 RX ADMIN — Medication SCH MG: at 21:27

## 2020-09-11 RX ADMIN — QUETIAPINE FUMARATE SCH MG: 100 TABLET ORAL at 21:27

## 2020-09-11 RX ADMIN — Medication SCH: at 00:09

## 2020-09-11 RX ADMIN — Medication SCH APPLIC: at 17:28

## 2020-09-11 RX ADMIN — BACITRACIN ZINC SCH GM: 500 OINTMENT TOPICAL at 21:27

## 2020-09-11 RX ADMIN — Medication SCH: at 15:34

## 2020-09-11 RX ADMIN — Medication SCH TAB: at 10:12

## 2020-09-11 RX ADMIN — BACITRACIN ZINC SCH GM: 500 OINTMENT TOPICAL at 10:15

## 2020-09-11 RX ADMIN — BUPRENORPHINE HYDROCHLORIDE, NALOXONE HYDROCHLORIDE SCH EACH: 8; 2 FILM, SOLUBLE BUCCAL; SUBLINGUAL at 10:12

## 2020-09-11 RX ADMIN — NICOTINE POLACRILEX PRN MG: 2 GUM, CHEWING ORAL at 06:30

## 2020-09-11 RX ADMIN — Medication SCH: at 06:29

## 2020-09-11 RX ADMIN — DIPHENOXYLATE HYDROCHLORIDE AND ATROPINE SULFATE PRN COMBO: 2.5; .025 TABLET ORAL at 00:47

## 2020-09-11 RX ADMIN — NICOTINE SCH MG: 14 PATCH, EXTENDED RELEASE TRANSDERMAL at 10:13

## 2020-09-11 RX ADMIN — NICOTINE POLACRILEX PRN MG: 2 GUM, CHEWING ORAL at 10:15

## 2020-09-12 RX ADMIN — Medication SCH: at 00:05

## 2020-09-12 RX ADMIN — NICOTINE POLACRILEX PRN MG: 2 GUM, CHEWING ORAL at 10:20

## 2020-09-12 RX ADMIN — BUPRENORPHINE HYDROCHLORIDE, NALOXONE HYDROCHLORIDE SCH EACH: 8; 2 FILM, SOLUBLE BUCCAL; SUBLINGUAL at 10:19

## 2020-09-12 RX ADMIN — BACITRACIN ZINC SCH GM: 500 OINTMENT TOPICAL at 21:08

## 2020-09-12 RX ADMIN — NICOTINE SCH MG: 14 PATCH, EXTENDED RELEASE TRANSDERMAL at 10:19

## 2020-09-12 RX ADMIN — Medication SCH MG: at 21:08

## 2020-09-12 RX ADMIN — Medication SCH: at 12:15

## 2020-09-12 RX ADMIN — BACITRACIN ZINC SCH GM: 500 OINTMENT TOPICAL at 10:20

## 2020-09-12 RX ADMIN — Medication SCH: at 18:21

## 2020-09-12 RX ADMIN — Medication SCH TAB: at 10:19

## 2020-09-12 RX ADMIN — QUETIAPINE FUMARATE SCH MG: 100 TABLET ORAL at 21:07

## 2020-09-12 RX ADMIN — Medication SCH MG: at 21:07

## 2020-09-12 RX ADMIN — NICOTINE POLACRILEX PRN MG: 2 GUM, CHEWING ORAL at 21:10

## 2020-09-12 RX ADMIN — Medication SCH: at 06:55

## 2020-09-13 RX ADMIN — Medication SCH TAB: at 10:09

## 2020-09-13 RX ADMIN — Medication SCH: at 18:52

## 2020-09-13 RX ADMIN — Medication SCH: at 12:20

## 2020-09-13 RX ADMIN — NICOTINE POLACRILEX PRN MG: 2 GUM, CHEWING ORAL at 06:01

## 2020-09-13 RX ADMIN — NICOTINE SCH MG: 14 PATCH, EXTENDED RELEASE TRANSDERMAL at 10:09

## 2020-09-13 RX ADMIN — BACITRACIN ZINC SCH GM: 500 OINTMENT TOPICAL at 21:05

## 2020-09-13 RX ADMIN — ALUMINUM HYDROXIDE, MAGNESIUM HYDROXIDE, AND SIMETHICONE PRN ML: 200; 200; 20 SUSPENSION ORAL at 04:52

## 2020-09-13 RX ADMIN — Medication SCH MG: at 21:05

## 2020-09-13 RX ADMIN — Medication SCH: at 00:30

## 2020-09-13 RX ADMIN — Medication SCH MG: at 21:04

## 2020-09-13 RX ADMIN — BUPRENORPHINE HYDROCHLORIDE, NALOXONE HYDROCHLORIDE SCH EACH: 8; 2 FILM, SOLUBLE BUCCAL; SUBLINGUAL at 10:10

## 2020-09-13 RX ADMIN — BACITRACIN ZINC SCH GM: 500 OINTMENT TOPICAL at 10:10

## 2020-09-13 RX ADMIN — QUETIAPINE FUMARATE SCH MG: 100 TABLET ORAL at 21:05

## 2020-09-13 RX ADMIN — ALUMINUM HYDROXIDE, MAGNESIUM HYDROXIDE, AND SIMETHICONE PRN ML: 200; 200; 20 SUSPENSION ORAL at 21:04

## 2020-09-13 RX ADMIN — Medication SCH APPLIC: at 06:00

## 2020-09-14 RX ADMIN — Medication SCH MG: at 21:44

## 2020-09-14 RX ADMIN — BUPRENORPHINE HYDROCHLORIDE, NALOXONE HYDROCHLORIDE SCH EACH: 8; 2 FILM, SOLUBLE BUCCAL; SUBLINGUAL at 10:12

## 2020-09-14 RX ADMIN — Medication SCH: at 00:30

## 2020-09-14 RX ADMIN — Medication SCH: at 17:33

## 2020-09-14 RX ADMIN — BACITRACIN ZINC SCH GM: 500 OINTMENT TOPICAL at 21:44

## 2020-09-14 RX ADMIN — ALUMINUM HYDROXIDE, MAGNESIUM HYDROXIDE, AND SIMETHICONE PRN ML: 200; 200; 20 SUSPENSION ORAL at 15:02

## 2020-09-14 RX ADMIN — Medication SCH: at 06:53

## 2020-09-14 RX ADMIN — Medication SCH: at 13:13

## 2020-09-14 RX ADMIN — NICOTINE SCH MG: 14 PATCH, EXTENDED RELEASE TRANSDERMAL at 10:13

## 2020-09-14 RX ADMIN — QUETIAPINE FUMARATE SCH MG: 100 TABLET ORAL at 21:44

## 2020-09-14 RX ADMIN — Medication SCH TAB: at 10:13

## 2020-09-14 RX ADMIN — BACITRACIN ZINC SCH GM: 500 OINTMENT TOPICAL at 10:13

## 2020-09-15 RX ADMIN — NICOTINE SCH MG: 14 PATCH, EXTENDED RELEASE TRANSDERMAL at 10:11

## 2020-09-15 RX ADMIN — Medication SCH: at 07:05

## 2020-09-15 RX ADMIN — QUETIAPINE FUMARATE SCH MG: 100 TABLET ORAL at 21:41

## 2020-09-15 RX ADMIN — Medication SCH MG: at 21:41

## 2020-09-15 RX ADMIN — Medication SCH APPLIC: at 12:56

## 2020-09-15 RX ADMIN — BUPRENORPHINE HYDROCHLORIDE, NALOXONE HYDROCHLORIDE SCH EACH: 8; 2 FILM, SOLUBLE BUCCAL; SUBLINGUAL at 10:10

## 2020-09-15 RX ADMIN — BACITRACIN ZINC SCH GM: 500 OINTMENT TOPICAL at 10:11

## 2020-09-15 RX ADMIN — Medication SCH TAB: at 10:10

## 2020-09-15 RX ADMIN — Medication SCH MG: at 21:42

## 2020-09-15 RX ADMIN — Medication SCH APPLIC: at 17:31

## 2020-09-15 RX ADMIN — Medication SCH: at 01:01

## 2020-09-15 RX ADMIN — BACITRACIN ZINC SCH GM: 500 OINTMENT TOPICAL at 21:41

## 2020-09-15 RX ADMIN — NICOTINE POLACRILEX PRN MG: 2 GUM, CHEWING ORAL at 17:32

## 2020-09-15 RX ADMIN — NICOTINE POLACRILEX PRN MG: 2 GUM, CHEWING ORAL at 10:13

## 2020-09-16 RX ADMIN — Medication SCH: at 06:34

## 2020-09-16 RX ADMIN — Medication SCH: at 00:18

## 2020-09-16 RX ADMIN — QUETIAPINE FUMARATE SCH MG: 100 TABLET ORAL at 21:17

## 2020-09-16 RX ADMIN — Medication SCH APPLIC: at 12:58

## 2020-09-16 RX ADMIN — Medication SCH MG: at 21:17

## 2020-09-16 RX ADMIN — BACITRACIN ZINC SCH GM: 500 OINTMENT TOPICAL at 21:17

## 2020-09-16 RX ADMIN — Medication SCH MG: at 21:18

## 2020-09-16 RX ADMIN — NICOTINE POLACRILEX PRN MG: 2 GUM, CHEWING ORAL at 15:34

## 2020-09-16 RX ADMIN — Medication SCH TAB: at 10:32

## 2020-09-16 RX ADMIN — BACITRACIN ZINC SCH GM: 500 OINTMENT TOPICAL at 10:32

## 2020-09-16 RX ADMIN — Medication SCH: at 17:23

## 2020-09-16 RX ADMIN — BUPRENORPHINE HYDROCHLORIDE, NALOXONE HYDROCHLORIDE SCH EACH: 8; 2 FILM, SOLUBLE BUCCAL; SUBLINGUAL at 10:32

## 2020-09-16 RX ADMIN — NICOTINE SCH MG: 14 PATCH, EXTENDED RELEASE TRANSDERMAL at 10:32

## 2020-09-17 RX ADMIN — BUPRENORPHINE HYDROCHLORIDE, NALOXONE HYDROCHLORIDE SCH EACH: 8; 2 FILM, SOLUBLE BUCCAL; SUBLINGUAL at 11:13

## 2020-09-17 RX ADMIN — QUETIAPINE FUMARATE SCH MG: 100 TABLET ORAL at 21:18

## 2020-09-17 RX ADMIN — NICOTINE POLACRILEX PRN MG: 2 GUM, CHEWING ORAL at 11:19

## 2020-09-17 RX ADMIN — Medication SCH MG: at 21:18

## 2020-09-17 RX ADMIN — Medication SCH: at 18:02

## 2020-09-17 RX ADMIN — Medication SCH: at 00:14

## 2020-09-17 RX ADMIN — Medication SCH: at 06:26

## 2020-09-17 RX ADMIN — Medication SCH TAB: at 11:13

## 2020-09-17 RX ADMIN — BACITRACIN ZINC SCH GM: 500 OINTMENT TOPICAL at 11:14

## 2020-09-17 RX ADMIN — BACITRACIN ZINC SCH GM: 500 OINTMENT TOPICAL at 21:19

## 2020-09-17 RX ADMIN — NICOTINE SCH MG: 14 PATCH, EXTENDED RELEASE TRANSDERMAL at 11:13

## 2020-09-17 RX ADMIN — Medication SCH: at 11:14

## 2020-09-17 RX ADMIN — Medication SCH MG: at 21:19

## 2020-09-18 RX ADMIN — Medication SCH: at 18:23

## 2020-09-18 RX ADMIN — BACITRACIN ZINC SCH GM: 500 OINTMENT TOPICAL at 09:56

## 2020-09-18 RX ADMIN — BACITRACIN ZINC SCH GM: 500 OINTMENT TOPICAL at 21:27

## 2020-09-18 RX ADMIN — Medication SCH TAB: at 09:56

## 2020-09-18 RX ADMIN — BUPRENORPHINE HYDROCHLORIDE, NALOXONE HYDROCHLORIDE SCH EACH: 8; 2 FILM, SOLUBLE BUCCAL; SUBLINGUAL at 09:56

## 2020-09-18 RX ADMIN — Medication SCH: at 12:05

## 2020-09-18 RX ADMIN — Medication SCH MG: at 21:27

## 2020-09-18 RX ADMIN — NICOTINE SCH MG: 14 PATCH, EXTENDED RELEASE TRANSDERMAL at 09:56

## 2020-09-18 RX ADMIN — NICOTINE POLACRILEX PRN MG: 2 GUM, CHEWING ORAL at 09:58

## 2020-09-18 RX ADMIN — Medication SCH: at 06:50

## 2020-09-18 RX ADMIN — QUETIAPINE FUMARATE SCH MG: 100 TABLET ORAL at 21:27

## 2020-09-18 RX ADMIN — Medication SCH: at 00:30

## 2020-09-18 RX ADMIN — NICOTINE POLACRILEX PRN MG: 2 GUM, CHEWING ORAL at 21:27

## 2020-09-19 RX ADMIN — Medication SCH: at 17:32

## 2020-09-19 RX ADMIN — Medication SCH: at 12:36

## 2020-09-19 RX ADMIN — Medication SCH MG: at 21:40

## 2020-09-19 RX ADMIN — Medication SCH: at 01:07

## 2020-09-19 RX ADMIN — Medication SCH: at 23:51

## 2020-09-19 RX ADMIN — NICOTINE SCH MG: 14 PATCH, EXTENDED RELEASE TRANSDERMAL at 10:39

## 2020-09-19 RX ADMIN — Medication SCH TAB: at 10:39

## 2020-09-19 RX ADMIN — Medication SCH: at 06:31

## 2020-09-19 RX ADMIN — QUETIAPINE FUMARATE SCH MG: 100 TABLET ORAL at 21:40

## 2020-09-19 RX ADMIN — BACITRACIN ZINC SCH GM: 500 OINTMENT TOPICAL at 10:39

## 2020-09-19 RX ADMIN — BUPRENORPHINE HYDROCHLORIDE, NALOXONE HYDROCHLORIDE SCH EACH: 8; 2 FILM, SOLUBLE BUCCAL; SUBLINGUAL at 10:40

## 2020-09-19 RX ADMIN — NICOTINE POLACRILEX PRN MG: 2 GUM, CHEWING ORAL at 10:40

## 2020-09-19 RX ADMIN — BACITRACIN ZINC SCH GM: 500 OINTMENT TOPICAL at 21:41

## 2020-09-19 RX ADMIN — NICOTINE POLACRILEX PRN MG: 2 GUM, CHEWING ORAL at 17:31

## 2020-09-19 RX ADMIN — Medication SCH MG: at 21:41

## 2020-09-20 RX ADMIN — Medication SCH MG: at 21:20

## 2020-09-20 RX ADMIN — Medication SCH: at 23:35

## 2020-09-20 RX ADMIN — BACITRACIN ZINC SCH GM: 500 OINTMENT TOPICAL at 09:38

## 2020-09-20 RX ADMIN — Medication SCH: at 11:40

## 2020-09-20 RX ADMIN — NICOTINE POLACRILEX PRN MG: 2 GUM, CHEWING ORAL at 15:16

## 2020-09-20 RX ADMIN — BUPRENORPHINE HYDROCHLORIDE, NALOXONE HYDROCHLORIDE SCH EACH: 8; 2 FILM, SOLUBLE BUCCAL; SUBLINGUAL at 09:39

## 2020-09-20 RX ADMIN — Medication SCH TAB: at 09:38

## 2020-09-20 RX ADMIN — QUETIAPINE FUMARATE SCH MG: 100 TABLET ORAL at 21:20

## 2020-09-20 RX ADMIN — NICOTINE POLACRILEX PRN MG: 2 GUM, CHEWING ORAL at 09:38

## 2020-09-20 RX ADMIN — Medication SCH: at 18:51

## 2020-09-20 RX ADMIN — BACITRACIN ZINC SCH GM: 500 OINTMENT TOPICAL at 21:20

## 2020-09-20 RX ADMIN — NICOTINE POLACRILEX PRN MG: 2 GUM, CHEWING ORAL at 21:21

## 2020-09-20 RX ADMIN — NICOTINE SCH MG: 14 PATCH, EXTENDED RELEASE TRANSDERMAL at 09:39

## 2020-09-20 RX ADMIN — Medication SCH: at 08:10

## 2020-09-21 RX ADMIN — Medication SCH APPLIC: at 06:27

## 2020-09-21 RX ADMIN — BACITRACIN ZINC SCH GM: 500 OINTMENT TOPICAL at 10:18

## 2020-09-21 RX ADMIN — Medication SCH: at 12:40

## 2020-09-21 RX ADMIN — NICOTINE SCH MG: 14 PATCH, EXTENDED RELEASE TRANSDERMAL at 10:18

## 2020-09-21 RX ADMIN — QUETIAPINE FUMARATE SCH MG: 100 TABLET ORAL at 21:28

## 2020-09-21 RX ADMIN — BUPRENORPHINE HYDROCHLORIDE, NALOXONE HYDROCHLORIDE SCH EACH: 8; 2 FILM, SOLUBLE BUCCAL; SUBLINGUAL at 10:18

## 2020-09-21 RX ADMIN — BACITRACIN ZINC SCH GM: 500 OINTMENT TOPICAL at 21:29

## 2020-09-21 RX ADMIN — Medication SCH TAB: at 10:18

## 2020-09-21 RX ADMIN — Medication SCH MG: at 21:29

## 2020-09-21 RX ADMIN — Medication SCH MG: at 21:28

## 2020-09-21 RX ADMIN — Medication SCH: at 17:14

## 2020-09-21 NOTE — PN
BHS Progress Note


Note: 





is scheduled for discharge tomorrow. Scripts for 30 days supply of 

medications(Seroquel 25 mg/bid & 100 mg/hs)will be electronically transmitted to

[facility], [address] [place] [id number]

## 2020-09-22 RX ADMIN — BUPRENORPHINE HYDROCHLORIDE, NALOXONE HYDROCHLORIDE SCH EACH: 8; 2 FILM, SOLUBLE BUCCAL; SUBLINGUAL at 10:14

## 2020-09-22 RX ADMIN — BACITRACIN ZINC SCH GM: 500 OINTMENT TOPICAL at 10:14

## 2020-09-22 RX ADMIN — ALUMINUM HYDROXIDE, MAGNESIUM HYDROXIDE, AND SIMETHICONE PRN ML: 200; 200; 20 SUSPENSION ORAL at 23:56

## 2020-09-22 RX ADMIN — Medication SCH: at 07:08

## 2020-09-22 RX ADMIN — Medication SCH TAB: at 10:14

## 2020-09-22 RX ADMIN — Medication SCH MG: at 21:20

## 2020-09-22 RX ADMIN — QUETIAPINE FUMARATE SCH MG: 100 TABLET ORAL at 21:20

## 2020-09-22 RX ADMIN — Medication SCH: at 18:55

## 2020-09-22 RX ADMIN — BACITRACIN ZINC SCH GM: 500 OINTMENT TOPICAL at 21:20

## 2020-09-22 RX ADMIN — NICOTINE SCH MG: 14 PATCH, EXTENDED RELEASE TRANSDERMAL at 10:14

## 2020-09-22 RX ADMIN — Medication SCH: at 12:34

## 2020-09-22 RX ADMIN — Medication SCH: at 00:30

## 2020-09-23 RX ADMIN — BACITRACIN ZINC SCH GM: 500 OINTMENT TOPICAL at 21:40

## 2020-09-23 RX ADMIN — Medication SCH: at 07:17

## 2020-09-23 RX ADMIN — Medication SCH TAB: at 10:17

## 2020-09-23 RX ADMIN — QUETIAPINE FUMARATE SCH MG: 100 TABLET ORAL at 21:40

## 2020-09-23 RX ADMIN — Medication SCH MG: at 21:41

## 2020-09-23 RX ADMIN — Medication SCH: at 00:35

## 2020-09-23 RX ADMIN — NICOTINE SCH MG: 14 PATCH, EXTENDED RELEASE TRANSDERMAL at 10:17

## 2020-09-23 RX ADMIN — BUPRENORPHINE HYDROCHLORIDE, NALOXONE HYDROCHLORIDE SCH EACH: 8; 2 FILM, SOLUBLE BUCCAL; SUBLINGUAL at 10:18

## 2020-09-23 RX ADMIN — Medication SCH MG: at 21:40

## 2020-09-23 RX ADMIN — Medication SCH: at 19:48

## 2020-09-23 RX ADMIN — BACITRACIN ZINC SCH GM: 500 OINTMENT TOPICAL at 10:18

## 2020-09-23 RX ADMIN — Medication SCH: at 12:14

## 2020-09-24 RX ADMIN — Medication SCH: at 17:41

## 2020-09-24 RX ADMIN — BUPRENORPHINE HYDROCHLORIDE, NALOXONE HYDROCHLORIDE SCH EACH: 8; 2 FILM, SOLUBLE BUCCAL; SUBLINGUAL at 10:03

## 2020-09-24 RX ADMIN — BACITRACIN ZINC SCH GM: 500 OINTMENT TOPICAL at 10:04

## 2020-09-24 RX ADMIN — Medication SCH APPLIC: at 06:51

## 2020-09-24 RX ADMIN — Medication SCH MG: at 21:39

## 2020-09-24 RX ADMIN — Medication SCH TAB: at 10:03

## 2020-09-24 RX ADMIN — BACITRACIN ZINC SCH GM: 500 OINTMENT TOPICAL at 21:40

## 2020-09-24 RX ADMIN — Medication SCH: at 12:53

## 2020-09-24 RX ADMIN — Medication SCH: at 23:43

## 2020-09-24 RX ADMIN — QUETIAPINE FUMARATE SCH MG: 100 TABLET ORAL at 21:39

## 2020-09-24 RX ADMIN — NICOTINE SCH MG: 14 PATCH, EXTENDED RELEASE TRANSDERMAL at 10:03

## 2020-09-25 RX ADMIN — NICOTINE SCH MG: 14 PATCH, EXTENDED RELEASE TRANSDERMAL at 09:57

## 2020-09-25 RX ADMIN — BACITRACIN ZINC SCH GM: 500 OINTMENT TOPICAL at 21:33

## 2020-09-25 RX ADMIN — BACITRACIN ZINC SCH GM: 500 OINTMENT TOPICAL at 09:58

## 2020-09-25 RX ADMIN — Medication SCH TAB: at 09:57

## 2020-09-25 RX ADMIN — Medication SCH MG: at 21:33

## 2020-09-25 RX ADMIN — Medication SCH: at 06:05

## 2020-09-25 RX ADMIN — Medication SCH: at 17:40

## 2020-09-25 RX ADMIN — Medication SCH: at 13:12

## 2020-09-25 RX ADMIN — QUETIAPINE FUMARATE SCH MG: 100 TABLET ORAL at 21:33

## 2020-09-25 RX ADMIN — BUPRENORPHINE HYDROCHLORIDE, NALOXONE HYDROCHLORIDE SCH EACH: 8; 2 FILM, SOLUBLE BUCCAL; SUBLINGUAL at 09:57

## 2020-09-26 RX ADMIN — BACITRACIN ZINC SCH GM: 500 OINTMENT TOPICAL at 10:22

## 2020-09-26 RX ADMIN — Medication SCH TAB: at 10:21

## 2020-09-26 RX ADMIN — Medication SCH: at 12:06

## 2020-09-26 RX ADMIN — QUETIAPINE FUMARATE SCH MG: 100 TABLET ORAL at 21:22

## 2020-09-26 RX ADMIN — Medication SCH: at 00:15

## 2020-09-26 RX ADMIN — Medication SCH MG: at 21:22

## 2020-09-26 RX ADMIN — NICOTINE SCH MG: 14 PATCH, EXTENDED RELEASE TRANSDERMAL at 10:21

## 2020-09-26 RX ADMIN — BUPRENORPHINE HYDROCHLORIDE, NALOXONE HYDROCHLORIDE SCH EACH: 8; 2 FILM, SOLUBLE BUCCAL; SUBLINGUAL at 10:21

## 2020-09-26 RX ADMIN — NICOTINE POLACRILEX PRN MG: 2 GUM, CHEWING ORAL at 16:06

## 2020-09-26 RX ADMIN — Medication SCH: at 06:04

## 2020-09-26 RX ADMIN — Medication SCH: at 17:06

## 2020-09-26 RX ADMIN — BACITRACIN ZINC SCH GM: 500 OINTMENT TOPICAL at 21:23

## 2020-09-27 RX ADMIN — BUPRENORPHINE HYDROCHLORIDE, NALOXONE HYDROCHLORIDE SCH EACH: 8; 2 FILM, SOLUBLE BUCCAL; SUBLINGUAL at 09:27

## 2020-09-27 RX ADMIN — Medication SCH: at 00:30

## 2020-09-27 RX ADMIN — Medication SCH MG: at 21:21

## 2020-09-27 RX ADMIN — Medication SCH TAB: at 09:27

## 2020-09-27 RX ADMIN — Medication SCH: at 23:57

## 2020-09-27 RX ADMIN — BACITRACIN ZINC SCH GM: 500 OINTMENT TOPICAL at 09:27

## 2020-09-27 RX ADMIN — Medication SCH: at 07:28

## 2020-09-27 RX ADMIN — Medication SCH MG: at 21:22

## 2020-09-27 RX ADMIN — QUETIAPINE FUMARATE SCH MG: 100 TABLET ORAL at 21:21

## 2020-09-27 RX ADMIN — NICOTINE SCH MG: 14 PATCH, EXTENDED RELEASE TRANSDERMAL at 09:27

## 2020-09-27 RX ADMIN — Medication SCH: at 12:10

## 2020-09-27 RX ADMIN — Medication SCH: at 18:18

## 2020-09-27 RX ADMIN — BACITRACIN ZINC SCH GM: 500 OINTMENT TOPICAL at 21:22

## 2020-09-28 RX ADMIN — BACITRACIN ZINC SCH GM: 500 OINTMENT TOPICAL at 21:29

## 2020-09-28 RX ADMIN — Medication SCH: at 18:34

## 2020-09-28 RX ADMIN — BUPRENORPHINE HYDROCHLORIDE, NALOXONE HYDROCHLORIDE SCH EACH: 8; 2 FILM, SOLUBLE BUCCAL; SUBLINGUAL at 10:01

## 2020-09-28 RX ADMIN — NICOTINE SCH MG: 14 PATCH, EXTENDED RELEASE TRANSDERMAL at 10:01

## 2020-09-28 RX ADMIN — Medication SCH MG: at 21:29

## 2020-09-28 RX ADMIN — BACITRACIN ZINC SCH GM: 500 OINTMENT TOPICAL at 10:01

## 2020-09-28 RX ADMIN — Medication SCH APPLIC: at 12:20

## 2020-09-28 RX ADMIN — Medication SCH MG: at 21:28

## 2020-09-28 RX ADMIN — Medication SCH: at 07:04

## 2020-09-28 RX ADMIN — QUETIAPINE FUMARATE SCH MG: 100 TABLET ORAL at 21:28

## 2020-09-28 RX ADMIN — Medication SCH TAB: at 10:00

## 2020-09-29 RX ADMIN — QUETIAPINE FUMARATE SCH MG: 100 TABLET ORAL at 21:19

## 2020-09-29 RX ADMIN — Medication SCH MG: at 21:19

## 2020-09-29 RX ADMIN — Medication SCH TAB: at 09:49

## 2020-09-29 RX ADMIN — Medication SCH: at 06:41

## 2020-09-29 RX ADMIN — BACITRACIN ZINC SCH GM: 500 OINTMENT TOPICAL at 09:48

## 2020-09-29 RX ADMIN — Medication SCH: at 11:46

## 2020-09-29 RX ADMIN — NICOTINE SCH MG: 14 PATCH, EXTENDED RELEASE TRANSDERMAL at 09:48

## 2020-09-29 RX ADMIN — NICOTINE POLACRILEX PRN MG: 2 GUM, CHEWING ORAL at 21:20

## 2020-09-29 RX ADMIN — BACITRACIN ZINC SCH GM: 500 OINTMENT TOPICAL at 21:19

## 2020-09-29 RX ADMIN — Medication SCH: at 17:49

## 2020-09-29 RX ADMIN — Medication SCH: at 01:07

## 2020-09-29 RX ADMIN — BUPRENORPHINE HYDROCHLORIDE, NALOXONE HYDROCHLORIDE SCH EACH: 8; 2 FILM, SOLUBLE BUCCAL; SUBLINGUAL at 09:49

## 2020-09-30 RX ADMIN — Medication SCH: at 00:57

## 2020-09-30 RX ADMIN — Medication SCH MG: at 21:22

## 2020-09-30 RX ADMIN — Medication SCH TAB: at 09:48

## 2020-09-30 RX ADMIN — Medication SCH: at 11:10

## 2020-09-30 RX ADMIN — BACITRACIN ZINC SCH: 500 OINTMENT TOPICAL at 21:23

## 2020-09-30 RX ADMIN — BUPRENORPHINE HYDROCHLORIDE, NALOXONE HYDROCHLORIDE SCH EACH: 8; 2 FILM, SOLUBLE BUCCAL; SUBLINGUAL at 09:48

## 2020-09-30 RX ADMIN — Medication SCH MG: at 21:23

## 2020-09-30 RX ADMIN — NICOTINE SCH MG: 14 PATCH, EXTENDED RELEASE TRANSDERMAL at 09:47

## 2020-09-30 RX ADMIN — Medication SCH: at 07:04

## 2020-09-30 RX ADMIN — QUETIAPINE FUMARATE SCH MG: 100 TABLET ORAL at 21:22

## 2020-09-30 RX ADMIN — BACITRACIN ZINC SCH GM: 500 OINTMENT TOPICAL at 09:47

## 2020-09-30 RX ADMIN — Medication SCH: at 17:24

## 2020-09-30 NOTE — PN
BHS Progress Note


Note: 





Patient is scheduled for discharge tomorrow. Scripts for 30 days supply of 

medications(Seroquel 25 mg/bid & 100 mg/hs)will be electronically transmitted to

[facility], [address] [place] [id number]

## 2020-10-01 VITALS — SYSTOLIC BLOOD PRESSURE: 140 MMHG | DIASTOLIC BLOOD PRESSURE: 90 MMHG | TEMPERATURE: 97.8 F | HEART RATE: 93 BPM

## 2020-10-01 RX ADMIN — Medication SCH: at 06:33

## 2020-10-01 RX ADMIN — Medication SCH: at 00:31

## 2020-10-01 RX ADMIN — NICOTINE SCH: 14 PATCH, EXTENDED RELEASE TRANSDERMAL at 09:33

## 2020-10-01 RX ADMIN — BUPRENORPHINE HYDROCHLORIDE, NALOXONE HYDROCHLORIDE SCH EACH: 8; 2 FILM, SOLUBLE BUCCAL; SUBLINGUAL at 09:33

## 2020-10-01 RX ADMIN — Medication SCH TAB: at 09:32

## 2020-10-01 RX ADMIN — BACITRACIN ZINC SCH: 500 OINTMENT TOPICAL at 09:34

## 2020-10-01 NOTE — DS
[facility] Rehab Discharge Summary





- [facility] Rehab Discharge Summary


Admission Date: 09/08/20


Discharge Date: 10/01/20





- History


Present History: Alcohol dependence, Cannabis dependence


Pertinent Past History: 





Asthma


Anemia


GERD


Bipolar Disorder





- Discharge Physical Exam


Vital Signs: 


                                   Vital Signs











Temperature  97.8 F   10/01/20 06:58


 


Pulse Rate  93 H  10/01/20 06:58


 


Respiratory Rate  18   10/01/20 06:58


 


Blood Pressure  140/90   10/01/20 06:58


 


O2 Sat by Pulse Oximetry (%)  100   10/01/20 06:58








General:Alert o x 3,oob with steady gait,denies s/h/i


Head:normocephalic, eomi,dulce maria


Neck:supple, no jvd


cardiac:s1 s2,rrr


lungs:ctab


abdomen:soft, +bs,nt,nd


MSK:Active FROM, all limbs. No edema 


skin:warm, intact





Pertinent Admission Physical Exam Findings: 





Stable and Unremarkable





- Treatment


Discharge Condition: Discharge condition good, Rehabilitated safely, Responded 

well, Outpatient referral accepted


Hospital Course: 


Pt completed rehab and discharging today. 


Pt accepted CD aftercare to [email or web address] [place], [place].





- Medication


Discharge Medications: 


Ambulatory Orders





Albuterol Sulfate Inhaler - [Ventolin HFA Inhaler -] 2 inhaler PO TID PRN 

09/03/20 


Bacitracin - [Bacitracin Topical Ointment -] 0.9 gm TP TID 09/03/20 


Budesonide/Formeterol Fumarate [SYMBICORT 80/4.5mcg -] 1 puff IH BID 09/03/20 


Quetiapine Fumarate [Seroquel -] 25 mg PO BID@1000,1600 #60 tablet 09/21/20 


Quetiapine Fumarate [Seroquel -] 100 mg PO HS #30 tablet 09/21/20 


Quetiapine Fumarate [Seroquel -] 25 mg PO BID #60 tablet 09/30/20 


Quetiapine Fumarate [Seroquel -] 100 mg PO HS #30 tablet 09/30/20 











- Medication-Assisted Treatment (MAT)


Medication-Assisted Treatment (MAT): No


Medication Prescribed: Suboxone





- Discharge Instructions


Diet, activity, other medical instructions: 





Diet:Regular





Activity:oob ad grayson 





Other medical instructions:follow up with primary care with PCP Dr Shafer on [address] [place] [address].








- Diagnosis


(1) Alcohol use disorder


Status: Chronic   





(2) Asthma


Status: Chronic   


Qualifiers: 


   Asthma severity: mild   Asthma persistence: intermittent   Asthma 

complication type: with status asthmaticus   Qualified Code(s): J45.22 - Mild 

intermittent asthma with status asthmaticus   





(3) Cannabis dependence


Status: Chronic   





(4) Encounter for monitoring Suboxone maintenance therapy


Status: Chronic   





(5) GERD (gastroesophageal reflux disease)


Status: Chronic   


Qualifiers: 


   Esophagitis presence: without esophagitis   Qualified Code(s): K21.9 - 

Gastro-esophageal reflux disease without esophagitis   





(6) History of depression


Status: Chronic   





(7) Nicotine dependence


Status: Chronic   


Qualifiers: 


   Nicotine product type: cigarettes   Substance use status: in withdrawal   

Qualified Code(s): F17.213 - Nicotine dependence, cigarettes, with withdrawal   





- Follow-up Referral


Minutes to complete discharge: 25





- AMA


Did Patient Leave Against Medical Advice: No


Additional Comments: 





Pt is on Suboxone program with her provider in [place] and reports she will be 

seeing her primary provider today/pharmacy for her 30 days supply  and has no 

need for courtesy Rx from this facility.